# Patient Record
Sex: FEMALE | Race: WHITE | Employment: PART TIME | URBAN - METROPOLITAN AREA
[De-identification: names, ages, dates, MRNs, and addresses within clinical notes are randomized per-mention and may not be internally consistent; named-entity substitution may affect disease eponyms.]

---

## 2017-03-21 ENCOUNTER — OFFICE VISIT (OUTPATIENT)
Dept: FAMILY MEDICINE CLINIC | Facility: CLINIC | Age: 72
End: 2017-03-21

## 2017-03-21 VITALS
RESPIRATION RATE: 20 BRPM | SYSTOLIC BLOOD PRESSURE: 112 MMHG | HEIGHT: 64 IN | HEART RATE: 69 BPM | OXYGEN SATURATION: 97 % | TEMPERATURE: 98 F | DIASTOLIC BLOOD PRESSURE: 80 MMHG | WEIGHT: 140 LBS | BODY MASS INDEX: 23.9 KG/M2

## 2017-03-21 DIAGNOSIS — R81 GLUCOSURIA: ICD-10-CM

## 2017-03-21 DIAGNOSIS — R30.0 DYSURIA: ICD-10-CM

## 2017-03-21 DIAGNOSIS — R10.9 FLANK PAIN: ICD-10-CM

## 2017-03-21 LAB
APPEARANCE: CLEAR
BILIRUBIN: NEGATIVE
GLUCOSE (URINE DIPSTICK): 500 MG/DL
GLUCOSE BLOOD: 269
KETONES (URINE DIPSTICK): NEGATIVE MG/DL
MULTISTIX LOT#: NORMAL NUMERIC
NITRITE, URINE: NEGATIVE
OCCULT BLOOD: NEGATIVE
PH, URINE: 5.5 (ref 4.5–8)
PROTEIN (URINE DIPSTICK): NEGATIVE MG/DL
SPECIFIC GRAVITY: 1.01 (ref 1–1.03)
TEST STRIP LOT #: NORMAL NUMERIC
URINE-COLOR: YELLOW
UROBILINOGEN,SEMI-QN: 0.2 MG/DL (ref 0–1.9)

## 2017-03-21 PROCEDURE — 82962 GLUCOSE BLOOD TEST: CPT | Performed by: NURSE PRACTITIONER

## 2017-03-21 PROCEDURE — 87088 URINE BACTERIA CULTURE: CPT | Performed by: NURSE PRACTITIONER

## 2017-03-21 PROCEDURE — 87086 URINE CULTURE/COLONY COUNT: CPT | Performed by: NURSE PRACTITIONER

## 2017-03-21 PROCEDURE — 87186 SC STD MICRODIL/AGAR DIL: CPT | Performed by: NURSE PRACTITIONER

## 2017-03-21 PROCEDURE — 81003 URINALYSIS AUTO W/O SCOPE: CPT | Performed by: NURSE PRACTITIONER

## 2017-03-21 PROCEDURE — 99213 OFFICE O/P EST LOW 20 MIN: CPT | Performed by: NURSE PRACTITIONER

## 2017-03-21 RX ORDER — AMOXICILLIN AND CLAVULANATE POTASSIUM 875; 125 MG/1; MG/1
1 TABLET, FILM COATED ORAL 2 TIMES DAILY
Qty: 28 TABLET | Refills: 0 | OUTPATIENT
Start: 2017-03-21 | End: 2017-04-04

## 2017-03-21 RX ORDER — CEFIXIME 400 MG/1
400 CAPSULE ORAL DAILY
Qty: 14 CAPSULE | Refills: 0 | Status: SHIPPED | OUTPATIENT
Start: 2017-03-21 | End: 2017-03-21 | Stop reason: ALTCHOICE

## 2017-03-21 NOTE — PROGRESS NOTES
CHIEF COMPLAINT:   Patient presents with:  Back Pain: Near kidney for 3 weeks  Painful Urination: s/s since today  Burning On Urination      HPI:   Ardyth Course is a 80year old female who presents with symptoms of UTI.  Complaining of urinary and dy LUNGS: denies shortness of breath, cough, or wheezing  GI: See HPI. No N/V/C/D. : See HPI. NEURO: no headaches.     EXAM:   /80 mmHg  Pulse 69  Temp(Src) 97.9 °F (36.6 °C) (Oral)  Resp 20  Ht 64\"  Wt 140 lb  BMI 24.02 kg/m2  SpO2 97%  GENERAL: w - urine + for trace leukocytes; reporting flank pain that is improving. Given hx of pyelo, will treat with cefixime. Hx of quinolone tendinopathy; not choosing bactrim d/t taking ACE-I and no kidney function/K+ results in Epic.     - Cefixime 400 MG Oral · Pain or burning when urinating  · Having to urinate more often than usual  · Urgent need to urinate  · Only a small amount of urine comes out  · Blood in urine  · Abdominal discomfort. This is usually in the lower abdomen above the pubic bone.   · Cloudy · You can use acetaminophen or ibuprofen for pain, fever, or discomfort, unless another medicine was prescribed. If you have chronic liver or kidney disease, talk with your healthcare provider before using these medicines.  Also talk with your provider if y · Fainting or loss of consciousness  · Rapid heart rate  When to seek medical advice  Call your healthcare provider right away if any of these occur:  · Fever of 100.4ºF (38. 0ºC) or higher, or as directed by your healthcare provider  · Symptoms are not bet

## 2017-03-24 ENCOUNTER — TELEPHONE (OUTPATIENT)
Dept: FAMILY MEDICINE CLINIC | Facility: CLINIC | Age: 72
End: 2017-03-24

## 2017-03-24 RX ORDER — CEFDINIR 300 MG/1
300 CAPSULE ORAL 2 TIMES DAILY
Qty: 20 CAPSULE | Refills: 0 | Status: SHIPPED | OUTPATIENT
Start: 2017-03-24 | End: 2017-04-03

## 2017-03-24 NOTE — TELEPHONE ENCOUNTER
Spoke with patient regarding UA culture results. Bacteria resistant to penicillin, is currently taking Augmentin.   Medication switched to cefdinir 300, BID x10 days- $12 copay per pharmacist.  Discussed d/c'ing Augmentin and starting cefdinir as directed

## 2017-08-11 ENCOUNTER — OFFICE VISIT (OUTPATIENT)
Dept: FAMILY MEDICINE CLINIC | Facility: CLINIC | Age: 72
End: 2017-08-11

## 2017-08-11 VITALS
HEART RATE: 68 BPM | WEIGHT: 145 LBS | DIASTOLIC BLOOD PRESSURE: 80 MMHG | SYSTOLIC BLOOD PRESSURE: 138 MMHG | TEMPERATURE: 98 F | BODY MASS INDEX: 25 KG/M2 | RESPIRATION RATE: 16 BRPM | OXYGEN SATURATION: 97 %

## 2017-08-11 DIAGNOSIS — H61.23 BILATERAL IMPACTED CERUMEN: ICD-10-CM

## 2017-08-11 DIAGNOSIS — J01.00 ACUTE MAXILLARY SINUSITIS, RECURRENCE NOT SPECIFIED: Primary | ICD-10-CM

## 2017-08-11 PROCEDURE — 99213 OFFICE O/P EST LOW 20 MIN: CPT | Performed by: NURSE PRACTITIONER

## 2017-08-11 PROCEDURE — 69209 REMOVE IMPACTED EAR WAX UNI: CPT | Performed by: NURSE PRACTITIONER

## 2017-08-11 RX ORDER — CEFDINIR 300 MG/1
300 CAPSULE ORAL 2 TIMES DAILY
Qty: 20 CAPSULE | Refills: 0 | Status: SHIPPED | OUTPATIENT
Start: 2017-08-11 | End: 2017-08-21

## 2017-08-11 NOTE — PROGRESS NOTES
CHIEF COMPLAINT:   Patient presents with:  Ear Problem: right      HPI:   Gilmer Fatima is a 71year old female who presents for upper respiratory symptoms for  1 weeks.  Patient reports congestion, ear pain, sinus pain, OTC cold meds have not been help SKIN: no rashes, no suspicious lesions  HEAD: atraumatic, normocephalic.  mild tenderness on palpation of maxillary  sinuses.   EYES: conjunctiva clear, EOM intact  EARS: TM's clear, no bulging, no retraction,small clear fluid, bony landmarks visible  NOSE: Sinus headaches can cause a gnawing pain behind the nose and eyes. The pain most often gets worse in the afternoon and evening. You may also run a fever. Sinus headaches are caused by colds or allergies that make the nasal passages inflamed or infected.   Kermit Goldberg

## 2017-08-11 NOTE — PATIENT INSTRUCTIONS
Sinus Headaches     When using nasal spray, keep your chin down and angle the spray away from center. Sinus headaches can cause a gnawing pain behind the nose and eyes. The pain most often gets worse in the afternoon and evening.  You may also run a f

## 2017-11-03 ENCOUNTER — OFFICE VISIT (OUTPATIENT)
Dept: FAMILY MEDICINE CLINIC | Facility: CLINIC | Age: 72
End: 2017-11-03

## 2017-11-03 VITALS
DIASTOLIC BLOOD PRESSURE: 90 MMHG | SYSTOLIC BLOOD PRESSURE: 134 MMHG | BODY MASS INDEX: 24.16 KG/M2 | OXYGEN SATURATION: 95 % | TEMPERATURE: 98 F | WEIGHT: 145 LBS | RESPIRATION RATE: 20 BRPM | HEIGHT: 65 IN | HEART RATE: 90 BPM

## 2017-11-03 DIAGNOSIS — J40 BRONCHITIS: ICD-10-CM

## 2017-11-03 DIAGNOSIS — J01.00 ACUTE MAXILLARY SINUSITIS, RECURRENCE NOT SPECIFIED: Primary | ICD-10-CM

## 2017-11-03 PROCEDURE — 99213 OFFICE O/P EST LOW 20 MIN: CPT | Performed by: NURSE PRACTITIONER

## 2017-11-03 RX ORDER — BENZONATATE 200 MG/1
200 CAPSULE ORAL 3 TIMES DAILY PRN
Qty: 20 CAPSULE | Refills: 0 | Status: SHIPPED | OUTPATIENT
Start: 2017-11-03 | End: 2017-12-16

## 2017-11-03 RX ORDER — CEFDINIR 300 MG/1
300 CAPSULE ORAL 2 TIMES DAILY
Qty: 20 CAPSULE | Refills: 0 | Status: SHIPPED | OUTPATIENT
Start: 2017-11-03 | End: 2017-11-13

## 2017-11-03 NOTE — PATIENT INSTRUCTIONS
Bronchitis, Antibiotic Treatment (Adult)    Bronchitis is an infection of the air passages (bronchial tubes) in your lungs. It often occurs when you have a cold.  This illness is contagious during the first few days and is spread through the air by coughi Follow-up care  Follow up with your healthcare provider, or as advised. If you had an X-ray or ECG (electrocardiogram), a specialist will review it. You will be notified of any new findings that may affect your care.   If you are age 72 or older, or if you Sinuses are air-filled spaces in the skull behind the face. They are kept moist and clean by a lining of mucosa. Things such as pollen, smoke, and chemical fumes can irritate the mucosa. It can then swell up.  As a response to irritation, the mucosa makes m © 0431-4265 [employer] [address]. [address] [facility] [place], [facility] [place]. All rights reserved. This information is not intended as a substitute for professional medical care. Always follow your healthcare professional's instructions.

## 2017-11-03 NOTE — PROGRESS NOTES
CHIEF COMPLAINT:   Patient presents with:  Stuffy Nose: post nasal drip s/s for 3 days  Headache  Cough: wet      HPI:   Miranda Jefferson is a 71year old female who presents for sinus congestion, cough, headache, sore throat, chills for  4  days.  Symptom REVIEW OF SYSTEMS:   GENERAL: feels well otherwise, no unplanned weight change,  Decreased appetite  SKIN: no rashes or abnormal skin lesions  HEENT: See HPI.     LUNGS: denies shortness of breath or wheezing, See HPI  CARDIOVASCULAR: denies chest pain or p Signed Prescriptions Disp Refills    cefdinir 300 MG Oral Cap 20 capsule 0      Sig: Take 1 capsule (300 mg total) by mouth 2 (two) times daily.       benzonatate 200 MG Oral Cap 20 capsule 0      Sig: Take 1 capsule (200 mg total) by mouth 3 (three) times · Your appetite may be poor, so a light diet is fine. Avoid dehydration by drinking 6 to 8 glasses of fluids per day (such as water, soft drinks, sports drinks, juices, tea, or soup). Extra fluids will help loosen secretions in the nose and lungs.   · Over- Acute sinusitis is irritation and swelling of the sinuses. It is usually caused by a viral infection after a common cold. Your doctor can help you find relief. What is acute sinusitis? Sinuses are air-filled spaces in the skull behind the face.  They are © 9555-0636 [employer] [address]. [address] [facility] [place], [facility] [place]. All rights reserved. This information is not intended as a substitute for professional medical care. Always follow your healthcare professional's instructions.             The

## 2017-12-16 ENCOUNTER — HOSPITAL ENCOUNTER (INPATIENT)
Facility: HOSPITAL | Age: 72
LOS: 1 days | Discharge: HOME | DRG: 193 | End: 2017-12-17
Attending: EMERGENCY MEDICINE | Admitting: STUDENT IN AN ORGANIZED HEALTH CARE EDUCATION/TRAINING PROGRAM
Payer: MEDICARE

## 2017-12-16 ENCOUNTER — APPOINTMENT (OUTPATIENT)
Dept: GENERAL RADIOLOGY | Age: 72
DRG: 193 | End: 2017-12-16
Payer: MEDICARE

## 2017-12-16 DIAGNOSIS — J98.01 BRONCHOSPASM: ICD-10-CM

## 2017-12-16 DIAGNOSIS — J18.9 COMMUNITY ACQUIRED PNEUMONIA, UNSPECIFIED LATERALITY: Primary | ICD-10-CM

## 2017-12-16 DIAGNOSIS — R73.9 HYPERGLYCEMIA: ICD-10-CM

## 2017-12-16 DIAGNOSIS — R09.02 HYPOXIA: ICD-10-CM

## 2017-12-16 PROCEDURE — 71020 XR CHEST PA + LAT CHEST (CPT=71020): CPT | Performed by: EMERGENCY MEDICINE

## 2017-12-16 PROCEDURE — 99223 1ST HOSP IP/OBS HIGH 75: CPT | Performed by: HOSPITALIST

## 2017-12-16 RX ORDER — SODIUM CHLORIDE 9 MG/ML
125 INJECTION, SOLUTION INTRAVENOUS CONTINUOUS
Status: DISCONTINUED | OUTPATIENT
Start: 2017-12-16 | End: 2017-12-16

## 2017-12-16 RX ORDER — ENOXAPARIN SODIUM 100 MG/ML
40 INJECTION SUBCUTANEOUS DAILY
Status: DISCONTINUED | OUTPATIENT
Start: 2017-12-16 | End: 2017-12-17

## 2017-12-16 RX ORDER — ONDANSETRON 2 MG/ML
8 INJECTION INTRAMUSCULAR; INTRAVENOUS EVERY 6 HOURS PRN
Status: DISCONTINUED | OUTPATIENT
Start: 2017-12-16 | End: 2017-12-17

## 2017-12-16 RX ORDER — ALBUTEROL SULFATE 2.5 MG/3ML
2.5 SOLUTION RESPIRATORY (INHALATION) EVERY 2 HOUR PRN
Status: DISCONTINUED | OUTPATIENT
Start: 2017-12-16 | End: 2017-12-17

## 2017-12-16 RX ORDER — DEXTROSE MONOHYDRATE 25 G/50ML
50 INJECTION, SOLUTION INTRAVENOUS
Status: DISCONTINUED | OUTPATIENT
Start: 2017-12-16 | End: 2017-12-17

## 2017-12-16 RX ORDER — ACETAMINOPHEN 500 MG
1000 TABLET ORAL ONCE
Status: COMPLETED | OUTPATIENT
Start: 2017-12-16 | End: 2017-12-16

## 2017-12-16 RX ORDER — INSULIN ASPART 100 [IU]/ML
0.1 INJECTION, SOLUTION INTRAVENOUS; SUBCUTANEOUS ONCE
Status: COMPLETED | OUTPATIENT
Start: 2017-12-16 | End: 2017-12-16

## 2017-12-16 RX ORDER — AMLODIPINE BESYLATE 5 MG/1
5 TABLET ORAL DAILY
Status: DISCONTINUED | OUTPATIENT
Start: 2017-12-17 | End: 2017-12-17

## 2017-12-16 RX ORDER — ACETAMINOPHEN 325 MG/1
650 TABLET ORAL EVERY 6 HOURS PRN
Status: DISCONTINUED | OUTPATIENT
Start: 2017-12-16 | End: 2017-12-17

## 2017-12-16 RX ORDER — TRAZODONE HYDROCHLORIDE 50 MG/1
50 TABLET ORAL NIGHTLY PRN
Status: DISCONTINUED | OUTPATIENT
Start: 2017-12-16 | End: 2017-12-17

## 2017-12-16 RX ORDER — LEVOTHYROXINE SODIUM 0.05 MG/1
50 TABLET ORAL
Status: DISCONTINUED | OUTPATIENT
Start: 2017-12-17 | End: 2017-12-17

## 2017-12-16 RX ORDER — LISINOPRIL 40 MG/1
40 TABLET ORAL DAILY
Status: DISCONTINUED | OUTPATIENT
Start: 2017-12-17 | End: 2017-12-17

## 2017-12-16 NOTE — H&P
[facility] HOSPITALIST  History and Physical     Estefanía Upton Patient Status:  Inpatient    1945 MRN [id number]   [facility] 5NW-A Attending Hemant Richardson MD   Hosp Day # 0 PCP Vasile Shoemaker     Chief Complaint: cough    History of (64.9 kg)   SpO2 96%   BMI 23.80 kg/m²   General: No acute distress. Alert and oriented x 3. HEENT: Normocephalic atraumatic. Moist mucous membranes. EOM-I. PERRLA. Anicteric. Neck: No lymphadenopathy. No JVD. No carotid bruits.   Respiratory: Course in b

## 2017-12-16 NOTE — PROGRESS NOTES
NURSING ADMISSION NOTE      Patient admitted via Ambulance  Oriented to room. Safety precautions initiated. Bed in low position. Call light in reach. Pt here from [place] [facility]. A+Ox4. VSS on 3L of O2. Wears no O2 at baseline. Up adlib.  Resting i

## 2017-12-16 NOTE — ED NOTES
Nasal swab done for influenza/respiratory panel testing. Universal precautions used while obtaining sample. Sample taken to lab. Yuliya well by pt.

## 2017-12-16 NOTE — ED PROVIDER NOTES
Patient Seen in: [facility] [place]    History   Patient presents with:  Cough/URI    Stated Complaint: cough    HPI    Patient complains of cough that started on Monday.   Patient has history of diabetes, high blood pressure, and h Supple  Lungs: Rhonchi bilaterally some cleared with cough. There is prolonged expiration and wheeze especially with cough  Heart: Normal S1 and S2, without murmur or rub. Distal pulses are strong and symmetric. Abdomen: Soft, nondistended.   Completely hypoxia suggest pneumonia. She was treated with supplemental oxygen with improvement of her sats. Patient treated with IV fluid and continuous albuterol and Atrovent nebulizer.   She was started on Rocephin and Zithromax antibiotic    Chest x-ray shows bi

## 2017-12-17 VITALS
WEIGHT: 145 LBS | RESPIRATION RATE: 16 BRPM | SYSTOLIC BLOOD PRESSURE: 131 MMHG | TEMPERATURE: 98 F | HEART RATE: 74 BPM | HEIGHT: 65 IN | BODY MASS INDEX: 24.16 KG/M2 | DIASTOLIC BLOOD PRESSURE: 59 MMHG | OXYGEN SATURATION: 97 %

## 2017-12-17 PROCEDURE — 99239 HOSP IP/OBS DSCHRG MGMT >30: CPT | Performed by: HOSPITALIST

## 2017-12-17 RX ORDER — DOXYCYCLINE HYCLATE 100 MG/1
100 CAPSULE ORAL 2 TIMES DAILY
Qty: 8 CAPSULE | Refills: 0 | Status: SHIPPED | OUTPATIENT
Start: 2017-12-17 | End: 2017-12-21

## 2017-12-17 RX ORDER — ALBUTEROL SULFATE 90 UG/1
1 AEROSOL, METERED RESPIRATORY (INHALATION) EVERY 6 HOURS PRN
Qty: 1 INHALER | Refills: 1 | Status: SHIPPED | OUTPATIENT
Start: 2017-12-17 | End: 2019-02-07 | Stop reason: ALTCHOICE

## 2017-12-17 RX ORDER — OSELTAMIVIR PHOSPHATE 75 MG/1
75 CAPSULE ORAL 2 TIMES DAILY
Status: DISCONTINUED | OUTPATIENT
Start: 2017-12-17 | End: 2017-12-17

## 2017-12-17 NOTE — PROGRESS NOTES
SPO2% ON ROOM AIR AT REST:94%    SPO2% AMBULATION ON ROOM AIR:91%    SPO2% AMBULATION ON O2:n/a ON n/a LITERS PER MINUTE

## 2017-12-17 NOTE — PLAN OF CARE
Diabetes/Glucose Control    • Glucose maintained within prescribed range Progressing        Patient/Family Goals    • Patient/Family Long Term Goal Progressing    • Patient/Family Short Term Goal Progressing            PROBLEM: PNA    ASSESS: Pt. Very plea

## 2017-12-17 NOTE — PROGRESS NOTES
NURSING DISCHARGE NOTE    Discharged Home via Wheelchair. Accompanied by Support staff  Belongings Taken by patient/family. DC HOME WITH FAMILY, IV REMOVED. OK FOR DC PER HOSPITALIST. DC INSTRUCTIONS/MED LIST/SCRIPTS GIVEN TO PATIENT AND EXPLAINED.  Q

## 2017-12-17 NOTE — PLAN OF CARE
Diabetes/Glucose Control    • Glucose maintained within prescribed range Progressing        Patient/Family Goals    • Patient/Family Long Term Goal Progressing    • Patient/Family Short Term Goal Progressing        DX: FLU/PNA  PLAN OF CARE: Shivani DUBOIS

## 2017-12-17 NOTE — PROGRESS NOTES
SP02% on room air at rest 93%     SP02% ambulation on room air 91%    Ambulated around unit (375 feet) on RA, maintains O2 saturation at 91% on RA.

## 2017-12-17 NOTE — PROGRESS NOTES
Not needing oxygen; breathing better with less cough and dyspnea. Vitals stable. Exam shows mostly clear lungs. Ok to d/c today. Outside window for tamiflu trx.     Mercy Tobias MD  [facility]  Internal Medicine Hospitalist  Pager [phone]

## 2017-12-18 NOTE — DISCHARGE SUMMARY
[facility] HOSPITALIST  DISCHARGE SUMMARY     Andrew Upton Patient Status:  Inpatient    1945 MRN [id number]   [facility] 5NW-A Attending No att. providers found   Hosp Day # 1 PAOLO Smith     Date of Admission: 2017  Date Prescription details   Albuterol Sulfate  (90 Base) MCG/ACT Aers  Commonly known as:  PROAIR HFA      Inhale 1 puff into the lungs every 6 (six) hours as needed for Wheezing.    Quantity:  1 Inhaler  Refills:  1     Doxycycline Hyclate 100 MG Caps  C extremities. Extremities: No edema.   -----------------------------------------------------------------------------------------------  PATIENT DISCHARGE INSTRUCTIONS: See electronic chart    Alexis Dempsey MD 12/18/2017    Time spent:  > 30 minutes

## 2018-03-04 ENCOUNTER — OFFICE VISIT (OUTPATIENT)
Dept: FAMILY MEDICINE CLINIC | Facility: CLINIC | Age: 73
End: 2018-03-04

## 2018-03-04 VITALS
TEMPERATURE: 98 F | OXYGEN SATURATION: 97 % | RESPIRATION RATE: 20 BRPM | BODY MASS INDEX: 24.16 KG/M2 | HEART RATE: 62 BPM | HEIGHT: 65 IN | WEIGHT: 145 LBS | SYSTOLIC BLOOD PRESSURE: 122 MMHG | DIASTOLIC BLOOD PRESSURE: 70 MMHG

## 2018-03-04 DIAGNOSIS — R30.0 DYSURIA: Primary | ICD-10-CM

## 2018-03-04 LAB
APPEARANCE: CLEAR
GLUCOSE (URINE DIPSTICK): 500 MG/DL
MULTISTIX LOT#: ABNORMAL NUMERIC
PH, URINE: 5.5 (ref 4.5–8)
SPECIFIC GRAVITY: 1.01 (ref 1–1.03)
URINE-COLOR: YELLOW
UROBILINOGEN,SEMI-QN: 0.2 MG/DL (ref 0–1.9)

## 2018-03-04 PROCEDURE — 99213 OFFICE O/P EST LOW 20 MIN: CPT | Performed by: NURSE PRACTITIONER

## 2018-03-04 PROCEDURE — 87086 URINE CULTURE/COLONY COUNT: CPT | Performed by: NURSE PRACTITIONER

## 2018-03-04 PROCEDURE — 81003 URINALYSIS AUTO W/O SCOPE: CPT | Performed by: NURSE PRACTITIONER

## 2018-03-04 RX ORDER — NITROFURANTOIN 25; 75 MG/1; MG/1
100 CAPSULE ORAL 2 TIMES DAILY
Qty: 14 CAPSULE | Refills: 0 | Status: SHIPPED | OUTPATIENT
Start: 2018-03-04 | End: 2018-03-11

## 2018-03-04 NOTE — PATIENT INSTRUCTIONS
· Wipe from front to back after using the bathroom every time. Consider wet wipes for cleaning.   · If sexually active urinate before and after sexual intercourse and wipe front to back  · Stay well hydrated, wear cotton underwear and avoid thongs to decrea

## 2018-03-04 NOTE — PROGRESS NOTES
John Serrato is a 71year old female. HPI:   Patient presents with urinary symptoms. Complaining of urinary frequency, urgency, dysuria, suprapubic pain, hematuria. Denies back pain, fever.   Duration: 2 days  Sexual activity: yes with partner  Vagina Normal rate, normal S1, S2  SKIN: no rashes,no suspicious lesions  MUSCULOSKELETAL:  No CVA tenderness  GI/:  abd soft, bladder soft and nondistended. no suprapubic tenderness  PSYCHIATRIC:  Alert and oriented x 3.     Results for orders placed or perform

## 2019-02-07 ENCOUNTER — OFFICE VISIT (OUTPATIENT)
Dept: INTERNAL MEDICINE CLINIC | Facility: CLINIC | Age: 74
End: 2019-02-07
Payer: MEDICARE

## 2019-02-07 ENCOUNTER — TELEPHONE (OUTPATIENT)
Dept: INTERNAL MEDICINE CLINIC | Facility: CLINIC | Age: 74
End: 2019-02-07

## 2019-02-07 VITALS
OXYGEN SATURATION: 98 % | RESPIRATION RATE: 18 BRPM | HEART RATE: 65 BPM | TEMPERATURE: 99 F | SYSTOLIC BLOOD PRESSURE: 134 MMHG | DIASTOLIC BLOOD PRESSURE: 70 MMHG | HEIGHT: 65 IN | WEIGHT: 147 LBS | BODY MASS INDEX: 24.49 KG/M2

## 2019-02-07 DIAGNOSIS — E11.65 UNCONTROLLED TYPE 2 DIABETES MELLITUS WITH HYPERGLYCEMIA (HCC): Primary | ICD-10-CM

## 2019-02-07 DIAGNOSIS — Z51.81 ENCOUNTER FOR MEDICATION MONITORING: ICD-10-CM

## 2019-02-07 DIAGNOSIS — Z12.39 SCREENING FOR BREAST CANCER: ICD-10-CM

## 2019-02-07 DIAGNOSIS — E03.9 HYPOTHYROIDISM, UNSPECIFIED TYPE: ICD-10-CM

## 2019-02-07 DIAGNOSIS — Z78.0 POSTMENOPAUSAL ESTROGEN DEFICIENCY: ICD-10-CM

## 2019-02-07 LAB
CARTRIDGE LOT#: 931 NUMERIC
HEMOGLOBIN A1C: 13.5 % (ref 4.3–5.6)

## 2019-02-07 PROCEDURE — 83036 HEMOGLOBIN GLYCOSYLATED A1C: CPT | Performed by: INTERNAL MEDICINE

## 2019-02-07 PROCEDURE — 99214 OFFICE O/P EST MOD 30 MIN: CPT | Performed by: INTERNAL MEDICINE

## 2019-02-07 RX ORDER — FLUCONAZOLE 150 MG/1
150 TABLET ORAL ONCE
Qty: 1 TABLET | Refills: 0 | Status: SHIPPED | OUTPATIENT
Start: 2019-02-07 | End: 2019-02-07

## 2019-02-07 RX ORDER — NITROFURANTOIN 25; 75 MG/1; MG/1
100 CAPSULE ORAL 2 TIMES DAILY
Qty: 14 CAPSULE | Refills: 0 | Status: SHIPPED | OUTPATIENT
Start: 2019-02-07 | End: 2019-03-04

## 2019-02-07 NOTE — TELEPHONE ENCOUNTER
Records Requested from:    Dr. Ela Ny (Previous PCP)  Phone: [phone]  Fax: [phone]      Confirmation was received. Copy of form made and placed in teal accordion file. Original form sent to scanning.

## 2019-02-07 NOTE — PROGRESS NOTES
[facility] Internal Medicine Office Note  Chief Complaint:   Patient presents with:  Establish Care  Diabetes      HPI:   This is a 68year old female coming in for establishing care  HPI    HTN - blood pressure has been doing well     Hypothyroi Rfl:          Depression Screening (PHQ-2/PHQ-9): Over the LAST 2 WEEKS   Little interest or pleasure in doing things (over the last two weeks)?: Not at all    Feeling down, depressed, or hopeless (over the last two weeks)?: Not at all    PHQ-2 SCORE: 0 visit:    Uncontrolled type 2 diabetes mellitus with hyperglycemia ([facility] [address] - a1c very uncontrolled at 13.5. She needs follow up with diabetic educator and endocrinology. Continue metformin and continue glimepiride for now.   Initiating jardiance but cost may Screen due on 12/16/2018  Fall Risk Screening due on 12/17/2018  Patient/Caregiver Education: Patient/Caregiver Education: There are no barriers to learning. Medical education done. Outcome: Patient verbalizes understanding.  Patient is notified to call wit

## 2019-02-07 NOTE — PATIENT INSTRUCTIONS
Blood work prior to next appointment    Make appointment for diabetic eye exam     Make appointment for endocrinology - call today     Make appointment for diabetes educator     Start jardiance once a day

## 2019-03-01 ENCOUNTER — APPOINTMENT (OUTPATIENT)
Dept: LAB | Age: 74
End: 2019-03-01
Attending: INTERNAL MEDICINE
Payer: MEDICARE

## 2019-03-01 DIAGNOSIS — E03.9 HYPOTHYROIDISM, UNSPECIFIED TYPE: ICD-10-CM

## 2019-03-01 DIAGNOSIS — E11.65 UNCONTROLLED TYPE 2 DIABETES MELLITUS WITH HYPERGLYCEMIA (HCC): ICD-10-CM

## 2019-03-01 DIAGNOSIS — Z51.81 ENCOUNTER FOR MEDICATION MONITORING: ICD-10-CM

## 2019-03-01 LAB
ALBUMIN SERPL-MCNC: 3.8 G/DL (ref 3.4–5)
ALBUMIN/GLOB SERPL: 1.2 {RATIO} (ref 1–2)
ALP LIVER SERPL-CCNC: 49 U/L (ref 55–142)
ALT SERPL-CCNC: 49 U/L (ref 13–56)
ANION GAP SERPL CALC-SCNC: 6 MMOL/L (ref 0–18)
AST SERPL-CCNC: 33 U/L (ref 15–37)
BILIRUB SERPL-MCNC: 0.6 MG/DL (ref 0.1–2)
BUN BLD-MCNC: 19 MG/DL (ref 7–18)
BUN/CREAT SERPL: 21.1 (ref 10–20)
CALCIUM BLD-MCNC: 8.7 MG/DL (ref 8.5–10.1)
CHLORIDE SERPL-SCNC: 108 MMOL/L (ref 98–107)
CHOLEST SMN-MCNC: 105 MG/DL (ref ?–200)
CO2 SERPL-SCNC: 27 MMOL/L (ref 21–32)
CREAT BLD-MCNC: 0.9 MG/DL (ref 0.55–1.02)
CREAT UR-SCNC: 70.3 MG/DL
GLOBULIN PLAS-MCNC: 3.3 G/DL (ref 2.8–4.4)
GLUCOSE BLD-MCNC: 200 MG/DL (ref 70–99)
HDLC SERPL-MCNC: 60 MG/DL (ref 40–59)
LDLC SERPL CALC-MCNC: 33 MG/DL (ref ?–100)
M PROTEIN MFR SERPL ELPH: 7.1 G/DL (ref 6.4–8.2)
MICROALBUMIN UR-MCNC: 0.68 MG/DL
MICROALBUMIN/CREAT 24H UR-RTO: 9.7 UG/MG (ref ?–30)
NONHDLC SERPL-MCNC: 45 MG/DL (ref ?–130)
OSMOLALITY SERPL CALC.SUM OF ELEC: 300 MOSM/KG (ref 275–295)
POTASSIUM SERPL-SCNC: 4.8 MMOL/L (ref 3.5–5.1)
SODIUM SERPL-SCNC: 141 MMOL/L (ref 136–145)
TRIGL SERPL-MCNC: 60 MG/DL (ref 30–149)
TSI SER-ACNC: 3.31 MIU/ML (ref 0.36–3.74)
VLDLC SERPL CALC-MCNC: 12 MG/DL (ref 0–30)

## 2019-03-01 PROCEDURE — 36415 COLL VENOUS BLD VENIPUNCTURE: CPT

## 2019-03-01 PROCEDURE — 80053 COMPREHEN METABOLIC PANEL: CPT

## 2019-03-01 PROCEDURE — 84443 ASSAY THYROID STIM HORMONE: CPT

## 2019-03-01 PROCEDURE — 82570 ASSAY OF URINE CREATININE: CPT

## 2019-03-01 PROCEDURE — 82043 UR ALBUMIN QUANTITATIVE: CPT

## 2019-03-01 PROCEDURE — 80061 LIPID PANEL: CPT

## 2019-03-04 PROBLEM — E78.5 DYSLIPIDEMIA: Status: ACTIVE | Noted: 2019-03-04

## 2019-03-04 PROBLEM — I10 ESSENTIAL HYPERTENSION: Status: ACTIVE | Noted: 2019-03-04

## 2019-03-07 ENCOUNTER — OFFICE VISIT (OUTPATIENT)
Dept: INTERNAL MEDICINE CLINIC | Facility: CLINIC | Age: 74
End: 2019-03-07
Payer: MEDICARE

## 2019-03-07 VITALS
WEIGHT: 139.5 LBS | HEIGHT: 65 IN | HEART RATE: 84 BPM | SYSTOLIC BLOOD PRESSURE: 122 MMHG | RESPIRATION RATE: 16 BRPM | BODY MASS INDEX: 23.24 KG/M2 | TEMPERATURE: 98 F | OXYGEN SATURATION: 97 % | DIASTOLIC BLOOD PRESSURE: 64 MMHG

## 2019-03-07 DIAGNOSIS — IMO0002 TOE PROBLEM: ICD-10-CM

## 2019-03-07 DIAGNOSIS — Z00.00 WELLNESS EXAMINATION: Primary | ICD-10-CM

## 2019-03-07 DIAGNOSIS — L85.3 DRY SKIN: ICD-10-CM

## 2019-03-07 DIAGNOSIS — E11.65 UNCONTROLLED TYPE 2 DIABETES MELLITUS WITH HYPERGLYCEMIA (HCC): ICD-10-CM

## 2019-03-07 DIAGNOSIS — E03.9 HYPOTHYROIDISM, UNSPECIFIED TYPE: ICD-10-CM

## 2019-03-07 DIAGNOSIS — I10 ESSENTIAL HYPERTENSION: ICD-10-CM

## 2019-03-07 PROBLEM — J18.9 COMMUNITY ACQUIRED PNEUMONIA: Status: RESOLVED | Noted: 2017-12-16 | Resolved: 2019-03-07

## 2019-03-07 PROBLEM — J98.01 BRONCHOSPASM: Status: RESOLVED | Noted: 2017-12-16 | Resolved: 2019-03-07

## 2019-03-07 PROBLEM — R09.02 HYPOXIA: Status: RESOLVED | Noted: 2017-12-16 | Resolved: 2019-03-07

## 2019-03-07 PROCEDURE — G0439 PPPS, SUBSEQ VISIT: HCPCS | Performed by: INTERNAL MEDICINE

## 2019-03-07 PROCEDURE — 96160 PT-FOCUSED HLTH RISK ASSMT: CPT | Performed by: INTERNAL MEDICINE

## 2019-03-07 PROCEDURE — 99397 PER PM REEVAL EST PAT 65+ YR: CPT | Performed by: INTERNAL MEDICINE

## 2019-03-07 NOTE — PROGRESS NOTES
REASON FOR VISIT:    Jaren Munoz is a 68year old female who presents for a MA Supervisit.     DM - she saw Dr. Pranav Srivastava  She is having issues with ozempic being covered so she has not received medication yet   She is on tresiba insulin 2 units and inc MG Oral Tab 2 (two) times daily. Disp:  Rfl:    AmLODIPine Besylate (NORVASC) 5 MG Oral Tab Take 1 tablet by mouth daily.  Disp:  Rfl:    Semaglutide (OZEMPIC) 0.25 or 0.5 MG/DOSE Subcutaneous Solution Pen-injector Inject 0.25 mg into the skin once a week Status     Hearing Problems?: No  Vision Problems? : No  Difficulty walking?: No  Difficulty dressing or bathing?: No  Problems with daily activities? : No  Memory Problems?: No      Fall/Risk Assessment     Have you fallen in the last 12 months?: 0-No  Fa Annually Creatinine (mg/dL)   Date Value   03/01/2019 0.90       Digoxin Serum Conc  Annually No results found for: DIGOXIN     Diabetes     HgbA1C  Annually HEMOGLOBIN A1C (%)   Date Value   02/07/2019 13.5       Creat/alb ratio  Annually Creatinine (mg/d kg/m²    > BP Readings from Last 3 Encounters:  03/07/19 : 122/64  03/04/19 : 130/78  02/07/19 : 134/70    GENERAL: well developed, well nourished, in no apparent distress  SKIN: no rashes, no suspicious lesions  HEENT: atraumatic, normocephalic, ears and Hypothyroidism -chronic. titrating dose as above. Hyperglycemia -uncontrolled DM. Insulin initiated and she is going up on dose every 3 days     Uncontrolled type 2 diabetes mellitus with hyperglycemia ([address] -new problem. Continue medication titration.

## 2019-03-07 NOTE — PATIENT INSTRUCTIONS
-Discuss with Dr. Jayce Roche goal TSH of 2.5 or below for thyroid     Vaseline to both feet nightly or Vanicream lotion

## 2019-03-28 ENCOUNTER — HOSPITAL ENCOUNTER (OUTPATIENT)
Dept: MAMMOGRAPHY | Age: 74
Discharge: HOME | End: 2019-03-28
Attending: INTERNAL MEDICINE
Payer: MEDICARE

## 2019-03-28 ENCOUNTER — HOSPITAL ENCOUNTER (OUTPATIENT)
Dept: BONE DENSITY | Age: 74
Discharge: HOME | End: 2019-03-28
Attending: INTERNAL MEDICINE
Payer: MEDICARE

## 2019-03-28 DIAGNOSIS — Z78.0 POSTMENOPAUSAL ESTROGEN DEFICIENCY: ICD-10-CM

## 2019-03-28 DIAGNOSIS — Z12.39 SCREENING FOR BREAST CANCER: ICD-10-CM

## 2019-03-28 PROCEDURE — 77063 BREAST TOMOSYNTHESIS BI: CPT | Performed by: INTERNAL MEDICINE

## 2019-03-28 PROCEDURE — 77067 SCR MAMMO BI INCL CAD: CPT | Performed by: INTERNAL MEDICINE

## 2019-03-28 PROCEDURE — 77080 DXA BONE DENSITY AXIAL: CPT | Performed by: INTERNAL MEDICINE

## 2019-04-04 RX ORDER — CALCIUM CITRATE/VITAMIN D3 200MG-6.25
TABLET ORAL
Qty: 200 STRIP | Refills: 0 | Status: SHIPPED | OUTPATIENT
Start: 2019-04-04 | End: 2019-06-03

## 2019-04-04 RX ORDER — BLOOD-GLUCOSE METER
1 EACH MISCELLANEOUS 2 TIMES DAILY
Qty: 1 DEVICE | Refills: 0 | Status: SHIPPED | OUTPATIENT
Start: 2019-04-04 | End: 2020-04-03

## 2019-04-04 RX ORDER — BLOOD-GLUCOSE CONTROL, LOW
1 EACH MISCELLANEOUS DAILY
Qty: 1 EACH | Refills: 0 | Status: SHIPPED | OUTPATIENT
Start: 2019-04-04

## 2019-04-04 RX ORDER — GLUCOSAM/CHON-MSM1/C/MANG/BOSW 500-416.6
1 TABLET ORAL 2 TIMES DAILY
Qty: 2 BOX | Refills: 0 | Status: SHIPPED | OUTPATIENT
Start: 2019-04-04 | End: 2020-03-16

## 2019-04-04 RX ORDER — BLOOD-GLUCOSE CONTROL, LOW
1 EACH MISCELLANEOUS DAILY
Qty: 1 EACH | Refills: 0 | Status: SHIPPED | OUTPATIENT
Start: 2019-04-04 | End: 2020-04-27

## 2019-04-04 RX ORDER — ISOPROPYL ALCOHOL 0.75 G/1
SWAB TOPICAL
Qty: 200 EACH | Refills: 0 | Status: SHIPPED | OUTPATIENT
Start: 2019-04-04 | End: 2019-07-09

## 2019-04-17 DIAGNOSIS — R73.9 HYPERGLYCEMIA: ICD-10-CM

## 2019-04-17 DIAGNOSIS — I10 ESSENTIAL HYPERTENSION: ICD-10-CM

## 2019-04-17 DIAGNOSIS — E06.3 HYPOTHYROIDISM DUE TO HASHIMOTO'S THYROIDITIS: ICD-10-CM

## 2019-04-17 DIAGNOSIS — E11.65 UNCONTROLLED TYPE 2 DIABETES MELLITUS WITH HYPERGLYCEMIA (HCC): ICD-10-CM

## 2019-04-17 DIAGNOSIS — E03.8 HYPOTHYROIDISM DUE TO HASHIMOTO'S THYROIDITIS: ICD-10-CM

## 2019-04-17 DIAGNOSIS — E78.5 DYSLIPIDEMIA: ICD-10-CM

## 2019-04-17 RX ORDER — GLIMEPIRIDE 4 MG/1
4 TABLET ORAL 2 TIMES DAILY
Qty: 180 TABLET | Refills: 3 | Status: CANCELLED | OUTPATIENT
Start: 2019-04-17

## 2019-04-17 RX ORDER — LEVOTHYROXINE SODIUM 0.05 MG/1
50 TABLET ORAL DAILY
Qty: 90 TABLET | Refills: 3 | Status: CANCELLED | OUTPATIENT
Start: 2019-04-17

## 2019-04-17 NOTE — TELEPHONE ENCOUNTER
Refill requested:   Requested Prescriptions     Pending Prescriptions Disp Refills   • metFORMIN HCl 1000 MG Oral Tab 180 tablet 3     Sig: Take 1 tablet (1,000 mg total) by mouth 2 (two) times daily with meals.    • Metoprolol Tartrate 50 MG Oral Tab 180 t Patient with [facility], [place] [facility]            [address] ED - [facility] AT [facility]  [address] [facility] [address] [facility] [facility]  6

## 2019-04-19 RX ORDER — METOPROLOL TARTRATE 50 MG/1
50 TABLET, FILM COATED ORAL 2 TIMES DAILY
Qty: 180 TABLET | Refills: 3 | Status: SHIPPED | OUTPATIENT
Start: 2019-04-19 | End: 2019-05-17

## 2019-04-19 RX ORDER — AMLODIPINE BESYLATE 5 MG/1
5 TABLET ORAL DAILY
Qty: 90 TABLET | Refills: 3 | Status: SHIPPED | OUTPATIENT
Start: 2019-04-19 | End: 2019-05-17

## 2019-04-19 RX ORDER — QUINAPRIL 40 MG/1
40 TABLET ORAL DAILY
Qty: 90 TABLET | Refills: 3 | Status: SHIPPED | OUTPATIENT
Start: 2019-04-19 | End: 2020-07-23

## 2019-04-25 ENCOUNTER — TELEPHONE (OUTPATIENT)
Dept: INTERNAL MEDICINE CLINIC | Facility: CLINIC | Age: 74
End: 2019-04-25

## 2019-04-25 NOTE — TELEPHONE ENCOUNTER
Received rx request from [facility] for her amlodipine and metformin pt still has refills at [facility] does she want us to switch to [facility].      LVM to call and clarify

## 2019-04-26 NOTE — TELEPHONE ENCOUNTER
Patient called back and stated that she picked up her prescriptions for  Amlodipine and metformin. She stated that she will talk with Dr. Gina George when she comes in July for her appointment to have her prescriptions sent to [facility] order pharmacy.  Please

## 2019-05-15 DIAGNOSIS — E03.8 HYPOTHYROIDISM DUE TO HASHIMOTO'S THYROIDITIS: ICD-10-CM

## 2019-05-15 DIAGNOSIS — E11.65 UNCONTROLLED TYPE 2 DIABETES MELLITUS WITH HYPERGLYCEMIA (HCC): ICD-10-CM

## 2019-05-15 DIAGNOSIS — E06.3 HYPOTHYROIDISM DUE TO HASHIMOTO'S THYROIDITIS: ICD-10-CM

## 2019-05-16 NOTE — TELEPHONE ENCOUNTER
Refill requested:   Requested Prescriptions     Pending Prescriptions Disp Refills   • amLODIPine Besylate 5 MG Oral Tab 90 tablet 3     Sig: Take 1 tablet (5 mg total) by mouth daily.    • glimepiride 4 MG Oral Tab 180 tablet 3     Sig: Take 1 tablet (4 mg

## 2019-05-17 RX ORDER — GLIMEPIRIDE 4 MG/1
4 TABLET ORAL 2 TIMES DAILY
Qty: 180 TABLET | Refills: 3 | OUTPATIENT
Start: 2019-05-17

## 2019-05-17 RX ORDER — LEVOTHYROXINE SODIUM 0.05 MG/1
50 TABLET ORAL DAILY
Qty: 90 TABLET | Refills: 1 | OUTPATIENT
Start: 2019-05-17

## 2019-05-17 RX ORDER — AMLODIPINE BESYLATE 5 MG/1
5 TABLET ORAL DAILY
Qty: 90 TABLET | Refills: 3 | Status: SHIPPED | OUTPATIENT
Start: 2019-05-17 | End: 2020-07-11

## 2019-05-17 RX ORDER — METOPROLOL TARTRATE 50 MG/1
50 TABLET, FILM COATED ORAL 2 TIMES DAILY
Qty: 180 TABLET | Refills: 3 | Status: SHIPPED | OUTPATIENT
Start: 2019-05-17 | End: 2020-07-11

## 2019-06-03 PROBLEM — E11.9 TYPE 2 DIABETES MELLITUS WITHOUT COMPLICATION, WITHOUT LONG-TERM CURRENT USE OF INSULIN (HCC): Status: ACTIVE | Noted: 2019-06-03

## 2019-06-16 ENCOUNTER — OFFICE VISIT (OUTPATIENT)
Dept: FAMILY MEDICINE CLINIC | Facility: CLINIC | Age: 74
End: 2019-06-16
Payer: MEDICARE

## 2019-06-16 VITALS
RESPIRATION RATE: 16 BRPM | DIASTOLIC BLOOD PRESSURE: 80 MMHG | BODY MASS INDEX: 22.66 KG/M2 | WEIGHT: 136 LBS | HEIGHT: 65 IN | TEMPERATURE: 98 F | OXYGEN SATURATION: 97 % | HEART RATE: 74 BPM | SYSTOLIC BLOOD PRESSURE: 140 MMHG

## 2019-06-16 DIAGNOSIS — J34.89 SINUS PRESSURE: Primary | ICD-10-CM

## 2019-06-16 DIAGNOSIS — S09.93XA FACIAL TRAUMA, INITIAL ENCOUNTER: ICD-10-CM

## 2019-06-16 PROCEDURE — 99213 OFFICE O/P EST LOW 20 MIN: CPT | Performed by: NURSE PRACTITIONER

## 2019-06-16 RX ORDER — DOXYCYCLINE HYCLATE 100 MG/1
100 CAPSULE ORAL 2 TIMES DAILY
Qty: 14 CAPSULE | Refills: 0 | Status: SHIPPED | OUTPATIENT
Start: 2019-06-16 | End: 2019-06-23

## 2019-06-16 NOTE — PATIENT INSTRUCTIONS
·  PLAN: Doxycycline, take as directed. Finish all the medication even if you feel better. · Avoid sun exposure during use of antibiotic to prevent sunburn. · Salt water gargles (1 tsp.  Salt in 6 oz lukewarm water), use several times daily to help decre any part of the body  · Seizures  General care  · If you were prescribed medicines for pain, use them as directed.  Note: Don’t take other medicines for pain without talking to your provider first.  · To help reduce swelling and pain, apply a cold source to follow your healthcare professional's instructions.
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
## 2019-06-20 ENCOUNTER — OFFICE VISIT (OUTPATIENT)
Dept: INTERNAL MEDICINE CLINIC | Facility: CLINIC | Age: 74
End: 2019-06-20
Payer: MEDICARE

## 2019-06-20 VITALS
HEIGHT: 65 IN | OXYGEN SATURATION: 95 % | BODY MASS INDEX: 22.74 KG/M2 | WEIGHT: 136.5 LBS | HEART RATE: 73 BPM | RESPIRATION RATE: 16 BRPM | TEMPERATURE: 99 F | DIASTOLIC BLOOD PRESSURE: 78 MMHG | SYSTOLIC BLOOD PRESSURE: 128 MMHG
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
## 2019-06-20 DIAGNOSIS — G44.309 INTERMITTENT POST-TRAUMATIC HEADACHE: ICD-10-CM

## 2019-06-20 DIAGNOSIS — H53.8 BLURRED VISION: Primary | ICD-10-CM

## 2019-06-20 PROCEDURE — 99213 OFFICE O/P EST LOW 20 MIN: CPT | Performed by: INTERNAL MEDICINE
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
## 2019-06-21 ENCOUNTER — TELEPHONE (OUTPATIENT)
Dept: INTERNAL MEDICINE CLINIC | Facility: CLINIC | Age: 74
End: 2019-06-21

## 2019-07-11 NOTE — TELEPHONE ENCOUNTER
Called patient to see if she needs this refill or if the pharmacy automatically sent a request.  Ester    These Rx were distributed in April 2019.

## 2019-07-18 ENCOUNTER — OFFICE VISIT (OUTPATIENT)
Dept: INTERNAL MEDICINE CLINIC | Facility: CLINIC | Age: 74
End: 2019-07-18
Payer: MEDICARE

## 2019-07-18 VITALS
RESPIRATION RATE: 16 BRPM | HEART RATE: 75 BPM | OXYGEN SATURATION: 98 % | BODY MASS INDEX: 22.33 KG/M2 | HEIGHT: 65 IN | WEIGHT: 134 LBS | TEMPERATURE: 98 F | DIASTOLIC BLOOD PRESSURE: 70 MMHG | SYSTOLIC BLOOD PRESSURE: 120 MMHG

## 2019-07-18 DIAGNOSIS — I10 ESSENTIAL HYPERTENSION: Primary | ICD-10-CM

## 2019-07-18 DIAGNOSIS — R39.15 URINARY URGENCY: ICD-10-CM

## 2019-07-18 DIAGNOSIS — E11.65 UNCONTROLLED TYPE 2 DIABETES MELLITUS WITH HYPERGLYCEMIA (HCC): ICD-10-CM

## 2019-07-18 LAB
APPEARANCE: CLEAR
BILIRUBIN: NEGATIVE
GLUCOSE (URINE DIPSTICK): 500 MG/DL
KETONES (URINE DIPSTICK): NEGATIVE MG/DL
LEUKOCYTES: NEGATIVE
MULTISTIX LOT#: NORMAL NUMERIC
NITRITE, URINE: NEGATIVE
OCCULT BLOOD: NEGATIVE
PH, URINE: 6 (ref 4.5–8)
PROTEIN (URINE DIPSTICK): NEGATIVE MG/DL
SPECIFIC GRAVITY: 1.01 (ref 1–1.03)
URINE-COLOR: YELLOW
UROBILINOGEN,SEMI-QN: 0.2 MG/DL (ref 0–1.9)

## 2019-07-18 PROCEDURE — 87086 URINE CULTURE/COLONY COUNT: CPT | Performed by: INTERNAL MEDICINE

## 2019-07-18 PROCEDURE — 87186 SC STD MICRODIL/AGAR DIL: CPT | Performed by: INTERNAL MEDICINE

## 2019-07-18 PROCEDURE — 87088 URINE BACTERIA CULTURE: CPT | Performed by: INTERNAL MEDICINE

## 2019-07-18 PROCEDURE — 81003 URINALYSIS AUTO W/O SCOPE: CPT | Performed by: INTERNAL MEDICINE

## 2019-07-18 PROCEDURE — 99213 OFFICE O/P EST LOW 20 MIN: CPT | Performed by: INTERNAL MEDICINE

## 2019-07-18 RX ORDER — CALCIUM CITRATE/VITAMIN D3 200MG-6.25
TABLET ORAL
Qty: 200 STRIP | Refills: 3 | Status: SHIPPED | OUTPATIENT
Start: 2019-07-18 | End: 2021-01-11

## 2019-07-18 RX ORDER — DIPHENHYDRAMINE HCL 25 MG
TABLET ORAL
Qty: 1 KIT | Refills: 0 | OUTPATIENT
Start: 2019-07-18

## 2019-07-18 RX ORDER — GLIMEPIRIDE 4 MG/1
4 TABLET ORAL 2 TIMES DAILY
Qty: 180 TABLET | Refills: 3 | Status: SHIPPED | OUTPATIENT
Start: 2019-07-18 | End: 2020-04-27

## 2019-07-18 RX ORDER — ISOPROPYL ALCOHOL 0.75 G/1
SWAB TOPICAL
Qty: 200 EACH | Refills: 3 | Status: SHIPPED | OUTPATIENT
Start: 2019-07-18
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
## 2019-07-21 RX ORDER — SULFAMETHOXAZOLE AND TRIMETHOPRIM 800; 160 MG/1; MG/1
1 TABLET ORAL 2 TIMES DAILY
Qty: 14 TABLET | Refills: 0 | Status: SHIPPED | OUTPATIENT
Start: 2019-07-21 | End: 2019-11-17

## 2019-08-09 ENCOUNTER — TELEPHONE (OUTPATIENT)
Dept: INTERNAL MEDICINE CLINIC | Facility: CLINIC | Age: 74
End: 2019-08-09

## 2019-08-09 RX ORDER — AMLODIPINE BESYLATE 5 MG/1
5 TABLET ORAL DAILY
Qty: 90 TABLET | Refills: 3 | OUTPATIENT
Start: 2019-08-09

## 2019-11-17 ENCOUNTER — OFFICE VISIT (OUTPATIENT)
Dept: FAMILY MEDICINE CLINIC | Facility: CLINIC | Age: 74
End: 2019-11-17
Payer: MEDICARE

## 2019-11-17 VITALS
OXYGEN SATURATION: 96 % | WEIGHT: 135 LBS | HEIGHT: 65 IN | HEART RATE: 77 BPM | DIASTOLIC BLOOD PRESSURE: 80 MMHG | SYSTOLIC BLOOD PRESSURE: 130 MMHG | BODY MASS INDEX: 22.49 KG/M2 | RESPIRATION RATE: 16 BRPM | TEMPERATURE: 98 F

## 2019-11-17 DIAGNOSIS — Z87.01 HISTORY OF PNEUMONIA: ICD-10-CM

## 2019-11-17 DIAGNOSIS — R05.9 COUGH: ICD-10-CM

## 2019-11-17 DIAGNOSIS — J01.40 ACUTE PANSINUSITIS, RECURRENCE NOT SPECIFIED: Primary | ICD-10-CM

## 2019-11-17 PROCEDURE — 99213 OFFICE O/P EST LOW 20 MIN: CPT | Performed by: NURSE PRACTITIONER

## 2019-11-17 RX ORDER — DOXYCYCLINE HYCLATE 100 MG
100 TABLET ORAL 2 TIMES DAILY
Qty: 14 TABLET | Refills: 0 | Status: SHIPPED | OUTPATIENT
Start: 2019-11-17 | End: 2019-11-24

## 2019-11-17 RX ORDER — LIRAGLUTIDE 6 MG/ML
INJECTION SUBCUTANEOUS
COMMUNITY
Start: 2019-08-23 | End: 2019-12-06

## 2019-11-17 NOTE — PATIENT INSTRUCTIONS
Humidifier in room  Sleep propped  Push fluids  Limit dairy  Mucinex as directed  Follow up with PCP or Immediate care if worsening symptoms or no improvement in 3 days    Sinusitis (Antibiotic Treatment)    The sinuses are air-filled spaces within the b · You can use an over-the-counter decongestant, unless a similar medicine was prescribed to you. Nasal sprays work the fastest. Use one that contains phenylephrine or oxymetazoline. First blow your nose gently. Then use the spray.  Do not use these medicine · Don’t have close contact with people who have sore throats, colds, or other upper respiratory infections. · Don’t smoke, and stay away from secondhand smoke. · Stay up to date with of your vaccines.   Date Last Reviewed: 11/1/2017  © 1050-9328 [employer]

## 2019-11-17 NOTE — PROGRESS NOTES
CHIEF COMPLAINT:   Patient presents with:  Cough: s/s for 2 weeks. Chest Congestion      HPI:   Jaren Munoz is a 69year old female who presents for cold symptoms for  2  weeks.  Symptoms have progressed into sinus congestion and been worsening sin • TRUEPLUS LANCETS 33G Does not apply Misc 1 lancet by Finger stick route 2 (two) times daily. Use as directed. DX E11.65 2 Box 0   • Blood Glucose Calibration (TRUE METRIX LEVEL 1) Low In Vitro Solution 1 Bottle by In Vitro route daily.  1 each 0   • Blood NECK: supple, non-tender  LUNGS: Breathing is non labored. Lungs clear to auscultation bilaterally, no wheezes or rhonchi. CARDIO: RRR without murmur  EXTREMITIES: no cyanosis, clubbing or edema  LYMPH:  + anterior cervical lymphadenopathy.         ASSESS · Drink plenty of water, hot tea, and other liquids. This may help thin nasal mucus. It also may help your sinuses drain fluids. · Heat may help soothe painful areas of your face. Use a towel soaked in hot water.  Or,  the shower and direct the war Call your healthcare provider if any of these occur:  · Facial pain or headache that gets worse  · Stiff neck  · Unusual drowsiness or confusion  · Swelling of your forehead or eyelids  · Vision problems, such as blurred or double vision  · Fever of 100.4º

## 2020-03-02 ENCOUNTER — TELEPHONE (OUTPATIENT)
Dept: INTERNAL MEDICINE CLINIC | Facility: CLINIC | Age: 75
End: 2020-03-02

## 2020-03-16 ENCOUNTER — TELEPHONE (OUTPATIENT)
Dept: INTERNAL MEDICINE CLINIC | Facility: CLINIC | Age: 75
End: 2020-03-16

## 2020-03-16 RX ORDER — SULFAMETHOXAZOLE AND TRIMETHOPRIM 800; 160 MG/1; MG/1
1 TABLET ORAL 2 TIMES DAILY
Qty: 14 TABLET | Refills: 0 | Status: SHIPPED | OUTPATIENT
Start: 2020-03-16 | End: 2020-04-27

## 2020-03-16 NOTE — TELEPHONE ENCOUNTER
Pt c/o urgency, burning with urination, bladder spasms, frequent small amounts of urine, and slight back pain. Pt denies fever, odor, abnormal urine color, or hematuria. Pt is drinking plenty of water.  Pt was referred to see uro previously however did not

## 2020-03-16 NOTE — TELEPHONE ENCOUNTER
Let her know due to coronavirus concerns, we are not seeing non-emergent cases in the office and therefore will send bactrim to pharmacy. She should call us if symptoms are not resolved by 72 hours.      (note: previous resistance to nitrofurantoin; intoler

## 2020-03-16 NOTE — TELEPHONE ENCOUNTER
Patient calling she feels she has a UTI; experiences frequently and has had symptoms for a week; can we call something into [facility] on [address]?  Please callback to let her know

## 2020-04-27 PROBLEM — E78.2 MIXED HYPERLIPIDEMIA DUE TO TYPE 2 DIABETES MELLITUS (HCC): Status: ACTIVE | Noted: 2020-04-27

## 2020-04-27 PROBLEM — E11.69 MIXED HYPERLIPIDEMIA DUE TO TYPE 2 DIABETES MELLITUS  (HCC): Status: ACTIVE | Noted: 2020-04-27

## 2020-04-27 PROBLEM — E11.69 MIXED HYPERLIPIDEMIA DUE TO TYPE 2 DIABETES MELLITUS: Status: ACTIVE | Noted: 2020-04-27

## 2020-04-27 PROBLEM — E11.69 MIXED HYPERLIPIDEMIA DUE TO TYPE 2 DIABETES MELLITUS (HCC): Status: ACTIVE | Noted: 2020-04-27

## 2020-04-27 PROBLEM — E78.2 MIXED HYPERLIPIDEMIA DUE TO TYPE 2 DIABETES MELLITUS  (HCC): Status: ACTIVE | Noted: 2020-04-27

## 2020-04-27 PROBLEM — E78.2 MIXED HYPERLIPIDEMIA DUE TO TYPE 2 DIABETES MELLITUS: Status: ACTIVE | Noted: 2020-04-27

## 2020-05-16 ENCOUNTER — HOSPITAL ENCOUNTER (OUTPATIENT)
Age: 75
Discharge: HOME | End: 2020-05-16
Attending: FAMILY MEDICINE
Payer: MEDICARE

## 2020-05-16 VITALS
RESPIRATION RATE: 20 BRPM | TEMPERATURE: 98 F | HEART RATE: 74 BPM | DIASTOLIC BLOOD PRESSURE: 60 MMHG | OXYGEN SATURATION: 95 % | SYSTOLIC BLOOD PRESSURE: 116 MMHG

## 2020-05-16 DIAGNOSIS — R81 ELEVATED SERUM GLUCOSE WITH GLUCOSURIA: ICD-10-CM

## 2020-05-16 DIAGNOSIS — R73.09 ELEVATED SERUM GLUCOSE WITH GLUCOSURIA: ICD-10-CM

## 2020-05-16 DIAGNOSIS — N30.01 ACUTE CYSTITIS WITH HEMATURIA: Primary | ICD-10-CM

## 2020-05-16 PROCEDURE — 82962 GLUCOSE BLOOD TEST: CPT

## 2020-05-16 PROCEDURE — 87086 URINE CULTURE/COLONY COUNT: CPT | Performed by: FAMILY MEDICINE

## 2020-05-16 PROCEDURE — 99214 OFFICE O/P EST MOD 30 MIN: CPT

## 2020-05-16 PROCEDURE — 81002 URINALYSIS NONAUTO W/O SCOPE: CPT | Performed by: FAMILY MEDICINE

## 2020-05-16 PROCEDURE — 99204 OFFICE O/P NEW MOD 45 MIN: CPT

## 2020-05-16 RX ORDER — SULFAMETHOXAZOLE AND TRIMETHOPRIM 800; 160 MG/1; MG/1
1 TABLET ORAL 2 TIMES DAILY
Qty: 14 TABLET | Refills: 0 | Status: SHIPPED | OUTPATIENT
Start: 2020-05-16 | End: 2020-05-23

## 2020-05-16 NOTE — ED INITIAL ASSESSMENT (HPI)
Pain with urination - started yesterday, took AZO  Yesterday and today. Denies fever. Also c/o frequency.   Pt has h/o of being admitted due to UTI

## 2020-05-16 NOTE — ED PROVIDER NOTES
Patient Seen in: [facility]      History   Patient presents with:  Urinary Symptoms    Stated Complaint: UTI X 1 DAY    HPI    71-year-old female with history of [employer] pneumonia, hypertension, hypothyroidism, and recurrent lymphadenopathy. Heart: S1,S2 RRR, No murmur  Lungs: CTA bilateral. No wheezes rales or rhonchi. Abd: +BS. soft. NT. No rebound. No guarding. Neg Yang's, Neg McBurney's, Neg Rovsing. No mass. No CVAT to percussion Alexandru  Neuro: A&O x3.  No focal deficit

## 2020-07-11 RX ORDER — METOPROLOL TARTRATE 50 MG/1
50 TABLET, FILM COATED ORAL 2 TIMES DAILY
Qty: 180 TABLET | Refills: 1 | Status: SHIPPED | OUTPATIENT
Start: 2020-07-11 | End: 2021-01-11

## 2020-07-11 RX ORDER — AMLODIPINE BESYLATE 5 MG/1
5 TABLET ORAL DAILY
Qty: 90 TABLET | Refills: 1 | Status: SHIPPED | OUTPATIENT
Start: 2020-07-11 | End: 2021-01-11

## 2020-07-23 ENCOUNTER — OFFICE VISIT (OUTPATIENT)
Dept: INTERNAL MEDICINE CLINIC | Facility: CLINIC | Age: 75
End: 2020-07-23
Payer: MEDICARE

## 2020-07-23 VITALS
HEIGHT: 64 IN | RESPIRATION RATE: 14 BRPM | TEMPERATURE: 98 F | SYSTOLIC BLOOD PRESSURE: 126 MMHG | WEIGHT: 135.5 LBS | HEART RATE: 86 BPM | DIASTOLIC BLOOD PRESSURE: 78 MMHG | BODY MASS INDEX: 23.13 KG/M2 | OXYGEN SATURATION: 97 %

## 2020-07-23 DIAGNOSIS — E78.2 MIXED HYPERLIPIDEMIA DUE TO TYPE 2 DIABETES MELLITUS (HCC): ICD-10-CM

## 2020-07-23 DIAGNOSIS — Z12.31 ENCOUNTER FOR SCREENING MAMMOGRAM FOR BREAST CANCER: ICD-10-CM

## 2020-07-23 DIAGNOSIS — E11.9 TYPE 2 DIABETES MELLITUS WITHOUT COMPLICATION, WITHOUT LONG-TERM CURRENT USE OF INSULIN (HCC): ICD-10-CM

## 2020-07-23 DIAGNOSIS — Z63.4 BEREAVEMENT: ICD-10-CM

## 2020-07-23 DIAGNOSIS — E03.8 HYPOTHYROIDISM DUE TO HASHIMOTO'S THYROIDITIS: ICD-10-CM

## 2020-07-23 DIAGNOSIS — E11.69 MIXED HYPERLIPIDEMIA DUE TO TYPE 2 DIABETES MELLITUS (HCC): ICD-10-CM

## 2020-07-23 DIAGNOSIS — R30.0 DYSURIA: ICD-10-CM

## 2020-07-23 DIAGNOSIS — I10 ESSENTIAL HYPERTENSION: ICD-10-CM

## 2020-07-23 DIAGNOSIS — E06.3 HYPOTHYROIDISM DUE TO HASHIMOTO'S THYROIDITIS: ICD-10-CM

## 2020-07-23 DIAGNOSIS — Z00.00 WELLNESS EXAMINATION: Primary | ICD-10-CM

## 2020-07-23 LAB
APPEARANCE: CLEAR
GLUCOSE (URINE DIPSTICK): NEGATIVE MG/DL
KETONES (URINE DIPSTICK): NEGATIVE MG/DL
MULTISTIX LOT#: NORMAL NUMERIC
NITRITE, URINE: NEGATIVE
PH, URINE: 5.5 (ref 4.5–8)
PROTEIN (URINE DIPSTICK): NEGATIVE MG/DL
SPECIFIC GRAVITY: 1.02 (ref 1–1.03)
URINE-COLOR: YELLOW
UROBILINOGEN,SEMI-QN: 0.2 MG/DL (ref 0–1.9)

## 2020-07-23 PROCEDURE — 87086 URINE CULTURE/COLONY COUNT: CPT | Performed by: INTERNAL MEDICINE

## 2020-07-23 PROCEDURE — G0439 PPPS, SUBSEQ VISIT: HCPCS | Performed by: INTERNAL MEDICINE

## 2020-07-23 PROCEDURE — 99397 PER PM REEVAL EST PAT 65+ YR: CPT | Performed by: INTERNAL MEDICINE

## 2020-07-23 PROCEDURE — 3008F BODY MASS INDEX DOCD: CPT | Performed by: INTERNAL MEDICINE

## 2020-07-23 PROCEDURE — 3074F SYST BP LT 130 MM HG: CPT | Performed by: INTERNAL MEDICINE

## 2020-07-23 PROCEDURE — 81003 URINALYSIS AUTO W/O SCOPE: CPT | Performed by: INTERNAL MEDICINE

## 2020-07-23 PROCEDURE — 96160 PT-FOCUSED HLTH RISK ASSMT: CPT | Performed by: INTERNAL MEDICINE

## 2020-07-23 PROCEDURE — 3078F DIAST BP <80 MM HG: CPT | Performed by: INTERNAL MEDICINE

## 2020-07-23 RX ORDER — QUINAPRIL 40 MG/1
40 TABLET ORAL DAILY
Qty: 90 TABLET | Refills: 3 | Status: SHIPPED | OUTPATIENT
Start: 2020-07-23 | End: 2021-11-05

## 2020-07-23 SDOH — SOCIAL STABILITY - SOCIAL INSECURITY: DISSAPEARANCE AND DEATH OF FAMILY MEMBER: Z63.4

## 2020-07-23 NOTE — PROGRESS NOTES
REASON FOR VISIT:    Danielito Musa is a 77year old female who presents for a MA Supervisit.     She has had sudden urges to urinate and has had painful spasms    She was prescribed abx in 5/2020 for similar symptoms but urine culture at that time was n daily. 180 tablet 3   • Levothyroxine Sodium 50 MCG Oral Tab Take 1 tablet (50 mcg total) by mouth daily. 90 tablet 1   • metFORMIN HCl 1000 MG Oral Tab Take 1 tablet (1,000 mg total) by mouth 2 (two) times daily with meals.  180 tablet 3   • Lio Gonzalez activity?: Light  How would you describe your current health state?: Good  How do you maintain positive mental well-being?: Social Interaction; Visiting Friends; Visiting Family  If you are a male age 38-65 or a female age 47-67, do you take aspirin?: Yes  H Correct  Recall \"Tree\": Correct         PREVENTATIVE SERVICES   INDICATIONS AND SCHEDULE Internal Lab or Procedure   Diabetes Screening     HbgA1C   Annually HEMOGLOBIN A1C (%)   Date Value   04/27/2020 6.7 (A)     HbA1c (%)   Date Value   07/22/2020 6. 9 Dry skin 3/7/2019   • Essential hypertension    • Hypothyroidism    • Toe problem 3/7/2019      Past Surgical History:   Procedure Laterality Date   • HYSTERECTOMY     • REMOVAL GALLBLADDER        Family History   Problem Relation Age of Onset   • Hyperten clubbing or edema  NEURO: Oriented times three, cranial nerves are intact, motor and sensory are grossly intact      ASSESSMENT AND OTHER RELEVANT CHRONIC CONDITIONS:   Kavita Savage is a 77year old female who presents for a Medicare Assessment.      P as above. The patient indicates understanding of these issues and agrees to the plan.   The patient is asked to return in 6 months for medication check  Exercise counseling perfomed    SUGGESTED VACCINATIONS - Influenza, Pneumococcal, Zoster, Tetanus

## 2020-07-24 PROBLEM — IMO0002: Status: RESOLVED | Noted: 2019-03-07 | Resolved: 2020-07-24

## 2020-07-24 PROBLEM — L85.3 DRY SKIN: Status: RESOLVED | Noted: 2019-03-07 | Resolved: 2020-07-24

## 2020-07-24 PROBLEM — Z63.4 BEREAVEMENT: Status: ACTIVE | Noted: 2020-07-24

## 2020-08-22 ENCOUNTER — HOSPITAL ENCOUNTER (OUTPATIENT)
Dept: MAMMOGRAPHY | Age: 75
Discharge: HOME | End: 2020-08-22
Attending: INTERNAL MEDICINE
Payer: MEDICARE

## 2020-08-22 DIAGNOSIS — Z12.31 ENCOUNTER FOR SCREENING MAMMOGRAM FOR BREAST CANCER: ICD-10-CM

## 2020-08-22 PROCEDURE — 77063 BREAST TOMOSYNTHESIS BI: CPT | Performed by: INTERNAL MEDICINE

## 2020-08-22 PROCEDURE — 77067 SCR MAMMO BI INCL CAD: CPT | Performed by: INTERNAL MEDICINE

## 2020-08-26 PROBLEM — N39.0 RECURRENT UTI: Status: ACTIVE | Noted: 2020-08-26

## 2021-01-07 ENCOUNTER — TELEPHONE (OUTPATIENT)
Dept: INTERNAL MEDICINE CLINIC | Facility: CLINIC | Age: 76
End: 2021-01-07

## 2021-01-11 RX ORDER — METOPROLOL TARTRATE 50 MG/1
TABLET, FILM COATED ORAL
Qty: 180 TABLET | Refills: 1 | Status: SHIPPED | OUTPATIENT
Start: 2021-01-11 | End: 2021-07-06

## 2021-01-11 RX ORDER — AMLODIPINE BESYLATE 5 MG/1
TABLET ORAL
Qty: 90 TABLET | Refills: 1 | Status: SHIPPED | OUTPATIENT
Start: 2021-01-11 | End: 2021-06-22

## 2021-03-08 DIAGNOSIS — Z23 NEED FOR VACCINATION: ICD-10-CM

## 2021-03-24 ENCOUNTER — IMMUNIZATION (OUTPATIENT)
Dept: LAB | Age: 76
End: 2021-03-24
Attending: HOSPITALIST
Payer: MEDICARE

## 2021-03-24 DIAGNOSIS — Z23 NEED FOR VACCINATION: Primary | ICD-10-CM

## 2021-03-24 PROCEDURE — 0001A SARSCOV2 VAC 30MCG/0.3ML IM: CPT

## 2021-04-14 ENCOUNTER — IMMUNIZATION (OUTPATIENT)
Dept: LAB | Age: 76
End: 2021-04-14
Attending: HOSPITALIST
Payer: MEDICARE

## 2021-04-14 DIAGNOSIS — Z23 NEED FOR VACCINATION: Primary | ICD-10-CM

## 2021-04-14 PROCEDURE — 0002A SARSCOV2 VAC 30MCG/0.3ML IM: CPT

## 2021-04-20 ENCOUNTER — OFFICE VISIT (OUTPATIENT)
Dept: INTERNAL MEDICINE CLINIC | Facility: CLINIC | Age: 76
End: 2021-04-20
Payer: MEDICARE

## 2021-04-20 VITALS
SYSTOLIC BLOOD PRESSURE: 138 MMHG | OXYGEN SATURATION: 96 % | HEIGHT: 64.5 IN | RESPIRATION RATE: 16 BRPM | BODY MASS INDEX: 23.04 KG/M2 | DIASTOLIC BLOOD PRESSURE: 80 MMHG | TEMPERATURE: 98 F | WEIGHT: 136.63 LBS | HEART RATE: 86 BPM

## 2021-04-20 DIAGNOSIS — I10 ESSENTIAL HYPERTENSION: ICD-10-CM

## 2021-04-20 DIAGNOSIS — E11.9 TYPE 2 DIABETES MELLITUS WITHOUT COMPLICATION, WITHOUT LONG-TERM CURRENT USE OF INSULIN (HCC): ICD-10-CM

## 2021-04-20 DIAGNOSIS — F43.29 STRESS AND ADJUSTMENT REACTION: ICD-10-CM

## 2021-04-20 DIAGNOSIS — E03.9 HYPOTHYROIDISM, UNSPECIFIED TYPE: ICD-10-CM

## 2021-04-20 DIAGNOSIS — Z00.00 WELLNESS EXAMINATION: Primary | ICD-10-CM

## 2021-04-20 DIAGNOSIS — E78.2 MIXED HYPERLIPIDEMIA DUE TO TYPE 2 DIABETES MELLITUS (HCC): ICD-10-CM

## 2021-04-20 DIAGNOSIS — E11.69 MIXED HYPERLIPIDEMIA DUE TO TYPE 2 DIABETES MELLITUS (HCC): ICD-10-CM

## 2021-04-20 DIAGNOSIS — R53.83 FATIGUE, UNSPECIFIED TYPE: ICD-10-CM

## 2021-04-20 PROCEDURE — 3008F BODY MASS INDEX DOCD: CPT | Performed by: INTERNAL MEDICINE

## 2021-04-20 PROCEDURE — 3075F SYST BP GE 130 - 139MM HG: CPT | Performed by: INTERNAL MEDICINE

## 2021-04-20 PROCEDURE — G0439 PPPS, SUBSEQ VISIT: HCPCS | Performed by: INTERNAL MEDICINE

## 2021-04-20 PROCEDURE — 3079F DIAST BP 80-89 MM HG: CPT | Performed by: INTERNAL MEDICINE

## 2021-04-20 PROCEDURE — 96160 PT-FOCUSED HLTH RISK ASSMT: CPT | Performed by: INTERNAL MEDICINE

## 2021-04-20 PROCEDURE — 99397 PER PM REEVAL EST PAT 65+ YR: CPT | Performed by: INTERNAL MEDICINE

## 2021-04-20 NOTE — PROGRESS NOTES
REASON FOR VISIT:    Luda York is a 77year old female who presents for a MA Supervisit.     She received her covid vaccine     She notes her vision has been less and she can't see with her glasses and needs ophtho referral     Hypothyroidism - on l total) by mouth daily. 90 tablet 3   • Blood Glucose Calibration (TRUE METRIX LEVEL 3) High In Vitro Solution 1 Bottle by In Vitro route daily.  100 each 3   • Alcohol Swabs (BD SWAB SINGLE USE REGULAR) Does not apply Pads USE AS DIRECTED 200 each 3   • Ins help  Toileting: Able without help  Dressing: Able without help  Eating: Able without help  Driving: Able without help  Preparing your meals: Able without help  Managing money/bills: Able without help  Taking medications as prescribed: Able without help  A Initial Preventative Physical Exam only, or if medically necessary n/a   Colorectal Cancer Screening     Colonoscopy Screen every 10 years Colonoscopy Never done    Flex Sigmoidoscopy Screen every 5 years No results found for this or any previous visit. 03/24/2021 04/14/2021      Tb Intradermal Test   10/27/2015        SOCIAL HISTORY:   Social History    Tobacco Use      Smoking status: Never Smoker      Smokeless tobacco: Never Used    Vaping Use      Vaping Use: Never used    Alcohol u SUMMARY:   Diagnoses and all orders for this visit:    Wellness examination     Type 2 diabetes mellitus without complication, without long-term current use of insulin ([facility] - due for A1c, sees endocrinology   -     MICROALB/CREAT RATIO, RANDOM URINE;  Futu

## 2021-04-21 PROBLEM — Z63.4 BEREAVEMENT: Status: RESOLVED | Noted: 2020-07-24 | Resolved: 2021-04-21

## 2021-05-07 ENCOUNTER — TELEPHONE (OUTPATIENT)
Dept: INTERNAL MEDICINE CLINIC | Facility: CLINIC | Age: 76
End: 2021-05-07

## 2021-05-07 DIAGNOSIS — E11.9 TYPE 2 DIABETES MELLITUS WITHOUT COMPLICATION, WITHOUT LONG-TERM CURRENT USE OF INSULIN (HCC): Primary | ICD-10-CM

## 2021-05-07 NOTE — TELEPHONE ENCOUNTER
Referral pending if appropriate. Do you recommend anyone specifically from [facility]? [address]! no

## 2021-05-07 NOTE — TELEPHONE ENCOUNTER
Patient made aware on new referral. Patient stated that she will contact office or insurance to ensure coverage prior to making appt.

## 2021-05-07 NOTE — TELEPHONE ENCOUNTER
Let her know I'm not sure that [facility] is in network but they would be able to let her know. Dr. Ericka Luque or any of the other ophthalmologists would be fine. Deirdre or [facility].  [phone]

## 2021-05-07 NOTE — TELEPHONE ENCOUNTER
Pt advd that Dr Olivia Tomlinson office wont be able to get her in for her eye exam for another 3 months due to referral issues with [facility] that the eye dr office is having. Pt is requesting any other recommendations to be seen sooner.

## 2021-06-11 ENCOUNTER — TELEPHONE (OUTPATIENT)
Dept: INTERNAL MEDICINE CLINIC | Facility: CLINIC | Age: 76
End: 2021-06-11

## 2021-06-11 NOTE — TELEPHONE ENCOUNTER
Pt c/o soreness/tenderness to the calf muscle near the ankle x 1 month. States pain is only with movement/activity. Did trip and fall on 4/3/2021 but believe this to be unrelated to pain. States it feels like a pulling sensation.     Denies redness, swellin

## 2021-06-11 NOTE — TELEPHONE ENCOUNTER
Pt stated her right leg is swollen around her knee area and is having a hard time walking. Pt has been taking motrin and ibuprofen for pain.      Pt is requesting a call back from the nurse for advice on if she should go to the ER or if she should come in o

## 2021-06-14 ENCOUNTER — OFFICE VISIT (OUTPATIENT)
Dept: INTERNAL MEDICINE CLINIC | Facility: CLINIC | Age: 76
End: 2021-06-14
Payer: MEDICARE

## 2021-06-14 VITALS
RESPIRATION RATE: 16 BRPM | OXYGEN SATURATION: 96 % | BODY MASS INDEX: 22.6 KG/M2 | DIASTOLIC BLOOD PRESSURE: 82 MMHG | SYSTOLIC BLOOD PRESSURE: 122 MMHG | HEIGHT: 64.5 IN | TEMPERATURE: 98 F | WEIGHT: 134 LBS | HEART RATE: 86 BPM

## 2021-06-14 DIAGNOSIS — I10 ESSENTIAL HYPERTENSION: ICD-10-CM

## 2021-06-14 DIAGNOSIS — M79.604 RIGHT LEG PAIN: Primary | ICD-10-CM

## 2021-06-14 DIAGNOSIS — E11.9 TYPE 2 DIABETES MELLITUS WITHOUT COMPLICATION, WITHOUT LONG-TERM CURRENT USE OF INSULIN (HCC): ICD-10-CM

## 2021-06-14 DIAGNOSIS — F43.9 STRESS: ICD-10-CM

## 2021-06-14 PROCEDURE — 3008F BODY MASS INDEX DOCD: CPT | Performed by: INTERNAL MEDICINE

## 2021-06-14 PROCEDURE — 3074F SYST BP LT 130 MM HG: CPT | Performed by: INTERNAL MEDICINE

## 2021-06-14 PROCEDURE — 3079F DIAST BP 80-89 MM HG: CPT | Performed by: INTERNAL MEDICINE

## 2021-06-14 PROCEDURE — 99214 OFFICE O/P EST MOD 30 MIN: CPT | Performed by: INTERNAL MEDICINE

## 2021-06-14 NOTE — PATIENT INSTRUCTIONS
Please see your therapist  I have referred you to our orthopedic surgeon.  If he does not think it is a pull, we can have the ultrasound done

## 2021-06-14 NOTE — PROGRESS NOTES
Patient Office Visit    ASSESSMENT AND PLAN:   (M79.604) Right leg pain  (primary encounter diagnosis)  Plan: ORTHOPEDIC - INTERNAL, US VENOUS DOPPLER LEG         RIGHT - DIAG IMG (CPT=93971)  Note: less likely to be a Deep venous thrombosis or superficial pain    1.  Right leg pain:   - started about 6 weeks ago   - she fell 1.5 months prior to the symptoms   - no new falls   - has difficulty walking as it is painful to bend the leg   - motrin helps relieve the pain and takes 400 mg twice a day   - no sonui daily., Disp: 90 each, Rfl: 1  LEVOTHYROXINE SODIUM 50 MCG Oral Tab, TAKE 1 TABLET (50 MCG TOTAL) BY MOUTH DAILY. , Disp: 90 tablet, Rfl: 1  Liraglutide (VICTOZA) 18 MG/3ML Subcutaneous Solution Pen-injector, INJECT 1.8MG SUBCUTANEOUSLY EVERY DAY, Disp: 27 96%   BMI 22.65 kg/m²     PHYSICAL EXAM:   Constitutional: Vital signs reviewed as noted, well developed, in no acute distress.    HENT: NCAT  Eyes: pupils reactive bilaterally  Cardiovascular: nl s1 s2 no m/r/g  Pulmonary/Chest: CTA bilaterally with no whe

## 2021-06-16 ENCOUNTER — TELEPHONE (OUTPATIENT)
Dept: INTERNAL MEDICINE CLINIC | Facility: CLINIC | Age: 76
End: 2021-06-16

## 2021-06-16 NOTE — TELEPHONE ENCOUNTER
Pt is requesting to talk to dr. Delicia Jeter or the triage nurse regarding her R Leg, pt stated there is some issues she would like to further discuss with the nurse, pt did not want to elaborate any further unless is with the nurse.

## 2021-06-16 NOTE — TELEPHONE ENCOUNTER
Pt called to report right calf pain much better and able to move foot. Pt states she is very comfortable with less muscle soreness. Feels as though it may have been just a pulled muscle.   States the Motrin she was taking for about 14 days made her tense

## 2021-06-16 NOTE — TELEPHONE ENCOUNTER
If patient doing much better then no need to see ortho and no need for the ultrasound.     [address] [facility]

## 2021-06-22 RX ORDER — AMLODIPINE BESYLATE 5 MG/1
TABLET ORAL
Qty: 90 TABLET | Refills: 1 | Status: SHIPPED | OUTPATIENT
Start: 2021-06-22 | End: 2021-12-16

## 2021-07-06 RX ORDER — METOPROLOL TARTRATE 50 MG/1
TABLET, FILM COATED ORAL
Qty: 180 TABLET | Refills: 0 | Status: SHIPPED | OUTPATIENT
Start: 2021-07-06 | End: 2021-10-02

## 2021-07-06 NOTE — TELEPHONE ENCOUNTER
Name from pharmacy: Metoprolol Tartrate 50 Mg Tab Yessy          Will file in chart as: METOPROLOL TARTRATE 50 MG Oral Tab    Sig: TAKE ONE TABLET BY MOUTH TWICE PER DAY     Original sig: TAKE ONE TABLET BY MOUTH TWICE PER DAY      Disp:  180 tablet    Refi

## 2021-08-25 PROCEDURE — 3051F HG A1C>EQUAL 7.0%<8.0%: CPT | Performed by: INTERNAL MEDICINE

## 2021-10-02 RX ORDER — METOPROLOL TARTRATE 50 MG/1
TABLET, FILM COATED ORAL
Qty: 180 TABLET | Refills: 3 | Status: SHIPPED | OUTPATIENT
Start: 2021-10-02

## 2021-10-02 NOTE — TELEPHONE ENCOUNTER
LOV: 6/14/2021 with Dr. Collin Farris  RTC: 1 week  Last Relevant Labs: 8/25/2021 (A1C)  Filled: 7/6/2021    #180 with 0 refills    Future Appointments   Date Time Provider Marco A Swani   10/22/2021  8:00 AM Josefina Jarrett MD [facility] [place]

## 2021-10-22 ENCOUNTER — OFFICE VISIT (OUTPATIENT)
Dept: INTERNAL MEDICINE CLINIC | Facility: CLINIC | Age: 76
End: 2021-10-22
Payer: MEDICARE

## 2021-10-22 VITALS
DIASTOLIC BLOOD PRESSURE: 84 MMHG | WEIGHT: 135.81 LBS | HEIGHT: 64.5 IN | SYSTOLIC BLOOD PRESSURE: 117 MMHG | OXYGEN SATURATION: 94 % | TEMPERATURE: 98 F | BODY MASS INDEX: 22.9 KG/M2 | RESPIRATION RATE: 16 BRPM | HEART RATE: 79 BPM

## 2021-10-22 DIAGNOSIS — Z12.31 SCREENING MAMMOGRAM FOR BREAST CANCER: ICD-10-CM

## 2021-10-22 DIAGNOSIS — Z23 NEED FOR INFLUENZA VACCINATION: ICD-10-CM

## 2021-10-22 DIAGNOSIS — E11.9 TYPE 2 DIABETES MELLITUS WITHOUT COMPLICATION, WITHOUT LONG-TERM CURRENT USE OF INSULIN (HCC): ICD-10-CM

## 2021-10-22 DIAGNOSIS — I10 ESSENTIAL HYPERTENSION: Primary | ICD-10-CM

## 2021-10-22 DIAGNOSIS — Z63.6 CAREGIVER BURDEN: ICD-10-CM

## 2021-10-22 PROCEDURE — 3074F SYST BP LT 130 MM HG: CPT | Performed by: INTERNAL MEDICINE

## 2021-10-22 PROCEDURE — 3008F BODY MASS INDEX DOCD: CPT | Performed by: INTERNAL MEDICINE

## 2021-10-22 PROCEDURE — 3079F DIAST BP 80-89 MM HG: CPT | Performed by: INTERNAL MEDICINE

## 2021-10-22 PROCEDURE — 99214 OFFICE O/P EST MOD 30 MIN: CPT | Performed by: INTERNAL MEDICINE

## 2021-10-22 SDOH — SOCIAL STABILITY - SOCIAL INSECURITY: DEPENDENT RELATIVE NEEDING CARE AT HOME: Z63.6

## 2021-10-22 NOTE — PATIENT INSTRUCTIONS
[email or web address]    To make an appointment for the COVID vaccine booster, or you can get at your local pharmacy   Doris [address], Gray \"Donaldo covid vaccine\"       [facility] [employer]

## 2021-10-22 NOTE — PROGRESS NOTES
[facility] Internal Medicine Office Note  Chief Complaint:   Patient presents with:  Diabetes: 6 month follow up; Due for mammo and colonoscopy   Immunization/Injection: Will like to discuss booster and flu vaccine ; Flagging for shingles vaccine by mouth 2 (two) times daily. 180 tablet 3   • Insulin Pen Needle (PEN NEEDLES) 32G X 4 MM Does not apply Misc 1 each by Does not apply route daily.  90 each 1   • LEVOTHYROXINE SODIUM 50 MCG Oral Tab TAKE 1 TABLET EVERY DAY 90 tablet 1   • AMLODIPINE BESYL reviewed. Appears stated age, well groomed. Physical Exam  Vitals reviewed. Constitutional:       General: She is not in acute distress. Appearance: She is well-developed. HENT:      Head: Normocephalic and atraumatic.    Eyes:      Conjunctiva/scl requested or ordered in this encounter       Imaging & Consults:  FLU VACC HIGH DOSE PRSV FREE  KEE VESTA 2D+3D SCREENING BILAT (CPT=77067/17024)    Pneumococcal Vaccine: 65+ Years(1 of 2 - PPSV23) Never done  Colonoscopy Never done  FIT/FOBT Colorectal Scr

## 2021-10-29 ENCOUNTER — HOSPITAL ENCOUNTER (OUTPATIENT)
Dept: MAMMOGRAPHY | Age: 76
Discharge: HOME | End: 2021-10-29
Attending: INTERNAL MEDICINE
Payer: MEDICARE

## 2021-10-29 DIAGNOSIS — Z12.31 SCREENING MAMMOGRAM FOR BREAST CANCER: ICD-10-CM

## 2021-10-29 PROCEDURE — 77063 BREAST TOMOSYNTHESIS BI: CPT | Performed by: INTERNAL MEDICINE

## 2021-10-29 PROCEDURE — 77067 SCR MAMMO BI INCL CAD: CPT | Performed by: INTERNAL MEDICINE

## 2021-11-03 ENCOUNTER — HOSPITAL ENCOUNTER (OUTPATIENT)
Dept: MAMMOGRAPHY | Age: 76
Discharge: HOME | End: 2021-11-03
Attending: INTERNAL MEDICINE
Payer: MEDICARE

## 2021-11-03 ENCOUNTER — TELEPHONE (OUTPATIENT)
Dept: INTERNAL MEDICINE CLINIC | Facility: CLINIC | Age: 76
End: 2021-11-03

## 2021-11-03 DIAGNOSIS — R92.8 ABNORMAL MAMMOGRAM: Primary | ICD-10-CM

## 2021-11-03 DIAGNOSIS — R92.2 INCONCLUSIVE MAMMOGRAM: ICD-10-CM

## 2021-11-03 PROCEDURE — 77065 DX MAMMO INCL CAD UNI: CPT | Performed by: INTERNAL MEDICINE

## 2021-11-03 PROCEDURE — 77061 BREAST TOMOSYNTHESIS UNI: CPT | Performed by: INTERNAL MEDICINE

## 2021-11-03 NOTE — IMAGING NOTE
Assisted Dr Yara Lozada with left breast biopsy recommendation, BIRADS 4b. Explained procedure and written instructions given. Pt screened and denies blood thinners, bleeding issues, chemo or liver disease.  Reviewed to eat, take 1000 mg of acetaminophen, and br

## 2021-11-03 NOTE — TELEPHONE ENCOUNTER
----- Message from Laxmi Rutherford MD sent at 11/3/2021  2:20 PM CDT -----  Regarding: CA  Hello I am recommending a left breast stereotactic biopsy for calcifications.     Thanks  Dr. Tamika Bhatia

## 2021-11-03 NOTE — TELEPHONE ENCOUNTER
Please let patient know I placed the order for biopsy in the system    (need for biopsy already discussed by radiology with patient)

## 2021-11-05 DIAGNOSIS — I10 ESSENTIAL HYPERTENSION: ICD-10-CM

## 2021-11-05 RX ORDER — QUINAPRIL 40 MG/1
40 TABLET ORAL DAILY
Qty: 90 TABLET | Refills: 3 | Status: SHIPPED | OUTPATIENT
Start: 2021-11-05

## 2021-11-05 NOTE — TELEPHONE ENCOUNTER
LOV: 10/22/2021 with Dr. Dax Valdovinos  RTC: 6 months  Last Relevant Labs: 8/25/2021 (A1C)  Filled: 7/23/2020    #90 with 3 refills    Future Appointments   Date Time Provider Marco A Olsen   11/30/2021 10:30 AM [facility] KEE BREAST BX [address] [facility]

## 2021-11-30 ENCOUNTER — HOSPITAL ENCOUNTER (OUTPATIENT)
Dept: MAMMOGRAPHY | Facility: HOSPITAL | Age: 76
Discharge: HOME | End: 2021-11-30
Attending: INTERNAL MEDICINE
Payer: MEDICARE

## 2021-11-30 DIAGNOSIS — R92.8 ABNORMAL MAMMOGRAM: ICD-10-CM

## 2021-11-30 PROCEDURE — 88305 TISSUE EXAM BY PATHOLOGIST: CPT | Performed by: INTERNAL MEDICINE

## 2021-11-30 PROCEDURE — 19499 UNLISTED PROCEDURE BREAST: CPT | Performed by: INTERNAL MEDICINE

## 2021-12-02 ENCOUNTER — TELEPHONE (OUTPATIENT)
Dept: MAMMOGRAPHY | Facility: HOSPITAL | Age: 76
End: 2021-12-02

## 2021-12-02 NOTE — TELEPHONE ENCOUNTER
Telephoned Melina Upton and name,  verified with patient. Notified Melina Upton of left breast negative for malignancy biopsy result. Concordance pending. Melina Upton reports biopsy site is healing well.   Radiologist recommends next mamm

## 2021-12-15 NOTE — TELEPHONE ENCOUNTER
Medication(s) to Refill:   Requested Prescriptions     Pending Prescriptions Disp Refills   • AMLODIPINE 5 MG Oral Tab [Pharmacy Med Name: Amlodipine Besylate 5 Mg Tab Unic] 90 tablet 0     Sig: TAKE ONE TABLET BY MOUTH DAILY.        LOV:  10-    RTC

## 2021-12-16 RX ORDER — AMLODIPINE BESYLATE 5 MG/1
TABLET ORAL
Qty: 90 TABLET | Refills: 3 | Status: SHIPPED | OUTPATIENT
Start: 2021-12-16

## 2021-12-30 ENCOUNTER — LAB ENCOUNTER (OUTPATIENT)
Dept: LAB | Age: 76
End: 2021-12-30
Attending: INTERNAL MEDICINE
Payer: MEDICARE

## 2021-12-30 ENCOUNTER — TELEPHONE (OUTPATIENT)
Dept: INTERNAL MEDICINE CLINIC | Facility: CLINIC | Age: 76
End: 2021-12-30

## 2021-12-30 DIAGNOSIS — Z11.52 ENCOUNTER FOR SCREENING FOR COVID-19: ICD-10-CM

## 2021-12-30 DIAGNOSIS — Z11.52 ENCOUNTER FOR SCREENING FOR COVID-19: Primary | ICD-10-CM

## 2021-12-30 NOTE — TELEPHONE ENCOUNTER
Pt c/o PND, runny nose, and sore throat. Denies fevers, chills, cough, congestion, N/V/D, SOB, and body aches. Denies any known exposure. Was around large group of people for holiday.  Patient states he family is concerned for her which is why she conta

## 2021-12-30 NOTE — TELEPHONE ENCOUNTER
Pt is calling because she has a \"sinus cold\" , post nasal drip, no fever, no aches and pains. She is vaccinated, no booster. Wants to speak to a nurse, she would like to know if she should get tested?

## 2022-01-02 LAB — SARS-COV-2 RNA RESP QL NAA+PROBE: DETECTED

## 2022-04-07 ENCOUNTER — TELEPHONE (OUTPATIENT)
Dept: INTERNAL MEDICINE CLINIC | Facility: CLINIC | Age: 77
End: 2022-04-07

## 2022-04-08 NOTE — TELEPHONE ENCOUNTER
Reached patient for medication adherence consult and to discuss statin use in diabetes. Discussed guidelines and recommendations for statin therapy in patients with diabetes. Patient tells me she is not interested in taking a statin a this time and states she doesn't have high cholesterol. Did provide education and discussed benefit of statin regardless of cholesterol in protecting patients with diabetes from heart attacks and strokes. She will discuss with PCP at future office visit. It does look like patient is due for an appt and strongly encouraged her to contact office to set that up as soon as able. Patient is also past due for refill on quinapril; 11/5/21 for 90 day supply. She tells me she is still taking this medication and when they switched to [facility] they received a double supply at one point. Strongly encouraged her to double check her supply and contact pharmacy if she it getting low. Patient confirmed understanding. Did provide education and stressed the importance of taking medication just like prescribed to get the most benefit. Patient denies forgetting or missing doses and tells me she uses a pillbox to organize her medications. Patient denies any questions or concerns with medications at this time.

## 2022-04-14 ENCOUNTER — TELEPHONE (OUTPATIENT)
Dept: INTERNAL MEDICINE CLINIC | Facility: CLINIC | Age: 77
End: 2022-04-14

## 2022-08-05 ENCOUNTER — OFFICE VISIT (OUTPATIENT)
Dept: INTERNAL MEDICINE CLINIC | Facility: CLINIC | Age: 77
End: 2022-08-05
Payer: MEDICARE

## 2022-08-05 VITALS
DIASTOLIC BLOOD PRESSURE: 88 MMHG | HEART RATE: 72 BPM | WEIGHT: 134.38 LBS | TEMPERATURE: 99 F | BODY MASS INDEX: 22.39 KG/M2 | RESPIRATION RATE: 16 BRPM | SYSTOLIC BLOOD PRESSURE: 138 MMHG | HEIGHT: 65 IN | OXYGEN SATURATION: 98 %

## 2022-08-05 DIAGNOSIS — I10 ESSENTIAL HYPERTENSION: ICD-10-CM

## 2022-08-05 DIAGNOSIS — E11.69 MIXED HYPERLIPIDEMIA DUE TO TYPE 2 DIABETES MELLITUS (HCC): ICD-10-CM

## 2022-08-05 DIAGNOSIS — N39.0 RECURRENT UTI: ICD-10-CM

## 2022-08-05 DIAGNOSIS — R92.8 ABNORMAL MAMMOGRAM: ICD-10-CM

## 2022-08-05 DIAGNOSIS — R71.8 ABNORMAL RBC INDICES: ICD-10-CM

## 2022-08-05 DIAGNOSIS — Z63.6 CAREGIVER STRESS: ICD-10-CM

## 2022-08-05 DIAGNOSIS — E78.2 MIXED HYPERLIPIDEMIA DUE TO TYPE 2 DIABETES MELLITUS (HCC): ICD-10-CM

## 2022-08-05 DIAGNOSIS — E03.9 HYPOTHYROIDISM, UNSPECIFIED TYPE: ICD-10-CM

## 2022-08-05 DIAGNOSIS — E11.9 TYPE 2 DIABETES MELLITUS WITHOUT COMPLICATION, WITHOUT LONG-TERM CURRENT USE OF INSULIN (HCC): ICD-10-CM

## 2022-08-05 DIAGNOSIS — Z00.00 WELLNESS EXAMINATION: Primary | ICD-10-CM

## 2022-08-05 LAB
CARTRIDGE LOT#: 981 NUMERIC
HEMOGLOBIN A1C: 7.2 % (ref 4.3–5.6)

## 2022-08-05 PROCEDURE — 99397 PER PM REEVAL EST PAT 65+ YR: CPT | Performed by: INTERNAL MEDICINE

## 2022-08-05 PROCEDURE — G0439 PPPS, SUBSEQ VISIT: HCPCS | Performed by: INTERNAL MEDICINE

## 2022-08-05 PROCEDURE — 3008F BODY MASS INDEX DOCD: CPT | Performed by: INTERNAL MEDICINE

## 2022-08-05 PROCEDURE — 96160 PT-FOCUSED HLTH RISK ASSMT: CPT | Performed by: INTERNAL MEDICINE

## 2022-08-05 PROCEDURE — 3079F DIAST BP 80-89 MM HG: CPT | Performed by: INTERNAL MEDICINE

## 2022-08-05 PROCEDURE — 1126F AMNT PAIN NOTED NONE PRSNT: CPT | Performed by: INTERNAL MEDICINE

## 2022-08-05 PROCEDURE — 83036 HEMOGLOBIN GLYCOSYLATED A1C: CPT | Performed by: INTERNAL MEDICINE

## 2022-08-05 PROCEDURE — 3075F SYST BP GE 130 - 139MM HG: CPT | Performed by: INTERNAL MEDICINE

## 2022-08-05 SDOH — SOCIAL STABILITY - SOCIAL INSECURITY: DEPENDENT RELATIVE NEEDING CARE AT HOME: Z63.6

## 2022-08-15 ENCOUNTER — OFFICE VISIT (OUTPATIENT)
Dept: INTERNAL MEDICINE CLINIC | Facility: CLINIC | Age: 77
End: 2022-08-15
Payer: MEDICARE

## 2022-08-15 VITALS
WEIGHT: 135.38 LBS | HEART RATE: 78 BPM | DIASTOLIC BLOOD PRESSURE: 80 MMHG | SYSTOLIC BLOOD PRESSURE: 128 MMHG | BODY MASS INDEX: 22.56 KG/M2 | TEMPERATURE: 98 F | RESPIRATION RATE: 16 BRPM | OXYGEN SATURATION: 98 % | HEIGHT: 65 IN

## 2022-08-15 DIAGNOSIS — R35.0 URINARY FREQUENCY: Primary | ICD-10-CM

## 2022-08-15 LAB
APPEARANCE: CLEAR
BILIRUBIN: NEGATIVE
GLUCOSE (URINE DIPSTICK): NEGATIVE MG/DL
KETONES (URINE DIPSTICK): NEGATIVE MG/DL
LEUKOCYTES: NEGATIVE
MULTISTIX LOT#: NORMAL NUMERIC
NITRITE, URINE: NEGATIVE
OCCULT BLOOD: NEGATIVE
PH, URINE: 5.5 (ref 4.5–8)
PROTEIN (URINE DIPSTICK): NEGATIVE MG/DL
SPECIFIC GRAVITY: 1.01 (ref 1–1.03)
URINE-COLOR: YELLOW
UROBILINOGEN,SEMI-QN: 0.2 MG/DL (ref 0–1.9)

## 2022-08-15 PROCEDURE — 3008F BODY MASS INDEX DOCD: CPT | Performed by: INTERNAL MEDICINE

## 2022-08-15 PROCEDURE — 87086 URINE CULTURE/COLONY COUNT: CPT | Performed by: INTERNAL MEDICINE

## 2022-08-15 PROCEDURE — 99213 OFFICE O/P EST LOW 20 MIN: CPT | Performed by: INTERNAL MEDICINE

## 2022-08-15 PROCEDURE — 3079F DIAST BP 80-89 MM HG: CPT | Performed by: INTERNAL MEDICINE

## 2022-08-15 PROCEDURE — 3074F SYST BP LT 130 MM HG: CPT | Performed by: INTERNAL MEDICINE

## 2022-08-16 ENCOUNTER — APPOINTMENT (OUTPATIENT)
Dept: ULTRASOUND IMAGING | Age: 77
End: 2022-08-16
Attending: EMERGENCY MEDICINE
Payer: MEDICARE

## 2022-08-16 ENCOUNTER — HOSPITAL ENCOUNTER (EMERGENCY)
Age: 77
Discharge: HOME | End: 2022-08-16
Attending: EMERGENCY MEDICINE
Payer: MEDICARE

## 2022-08-16 ENCOUNTER — APPOINTMENT (OUTPATIENT)
Dept: GENERAL RADIOLOGY | Age: 77
End: 2022-08-16
Attending: EMERGENCY MEDICINE
Payer: MEDICARE

## 2022-08-16 VITALS
HEART RATE: 70 BPM | SYSTOLIC BLOOD PRESSURE: 142 MMHG | DIASTOLIC BLOOD PRESSURE: 67 MMHG | OXYGEN SATURATION: 94 % | WEIGHT: 135 LBS | BODY MASS INDEX: 22.49 KG/M2 | TEMPERATURE: 98 F | RESPIRATION RATE: 18 BRPM | HEIGHT: 65 IN

## 2022-08-16 DIAGNOSIS — M79.605 PAIN OF LEFT LOWER EXTREMITY: ICD-10-CM

## 2022-08-16 DIAGNOSIS — M71.22 BAKER'S CYST OF KNEE, LEFT: ICD-10-CM

## 2022-08-16 DIAGNOSIS — M25.562 ACUTE PAIN OF LEFT KNEE: Primary | ICD-10-CM

## 2022-08-16 LAB
BILIRUB UR QL STRIP.AUTO: NEGATIVE
CLARITY UR REFRACT.AUTO: CLEAR
COLOR UR AUTO: YELLOW
GLUCOSE UR STRIP.AUTO-MCNC: NEGATIVE MG/DL
KETONES UR STRIP.AUTO-MCNC: NEGATIVE MG/DL
NITRITE UR QL STRIP.AUTO: NEGATIVE
PH UR STRIP.AUTO: 5 [PH] (ref 5–8)
PROT UR STRIP.AUTO-MCNC: NEGATIVE MG/DL
RBC UR QL AUTO: NEGATIVE
SP GR UR STRIP.AUTO: 1.01 (ref 1–1.03)
UROBILINOGEN UR STRIP.AUTO-MCNC: 0.2 MG/DL

## 2022-08-16 PROCEDURE — 73552 X-RAY EXAM OF FEMUR 2/>: CPT | Performed by: EMERGENCY MEDICINE

## 2022-08-16 PROCEDURE — 73590 X-RAY EXAM OF LOWER LEG: CPT | Performed by: EMERGENCY MEDICINE

## 2022-08-16 PROCEDURE — 81001 URINALYSIS AUTO W/SCOPE: CPT | Performed by: EMERGENCY MEDICINE

## 2022-08-16 PROCEDURE — 99284 EMERGENCY DEPT VISIT MOD MDM: CPT | Performed by: EMERGENCY MEDICINE

## 2022-08-16 PROCEDURE — 93971 EXTREMITY STUDY: CPT | Performed by: EMERGENCY MEDICINE

## 2022-08-16 PROCEDURE — 81015 MICROSCOPIC EXAM OF URINE: CPT | Performed by: EMERGENCY MEDICINE

## 2022-08-17 ENCOUNTER — TELEPHONE (OUTPATIENT)
Dept: INTERNAL MEDICINE CLINIC | Facility: CLINIC | Age: 77
End: 2022-08-17

## 2022-08-17 DIAGNOSIS — M79.605 LEFT LEG PAIN: Primary | ICD-10-CM

## 2022-08-17 DIAGNOSIS — M71.22 BAKER'S CYST OF KNEE, LEFT: ICD-10-CM

## 2022-08-17 NOTE — TELEPHONE ENCOUNTER
Spoke with patient via phone. Made aware referral for Dr. Lyly Vaz has been placed and pending approval.  Explained office can call once referral has been approved and that patient can also check back with our office in 3-4 days if she would like to check sooner. Patient verbalized understanding. Denies further questions.

## 2022-08-17 NOTE — TELEPHONE ENCOUNTER
Pt stated she has appointment with Dr Marce Lopes on 9/1. Pt stated she is on a waiting list to be seen earlier with Dr Marce Lopes.      Future Appointments   Date Time Provider Marco A Olsen   9/1/2022  1:20 PM Rosana Crockett MD [facility] [place] [place] [id number]   2/6/2023  9:00 AM Meg Kelly MD [facility] 8 [facility]        Pt just wanted to let Dr Emilee jensen - Sunny Tian

## 2022-08-17 NOTE — TELEPHONE ENCOUNTER
LS- Patient requesting referral to ortho, order pended for your review    [facility] for [facility] pain. US Venous Doppler left leg negative for DVT, but positive for Baker's cyst.  Was recommended she follow-up with ortho (Dr. Salvatore Iqbal).

## 2022-09-01 ENCOUNTER — HOSPITAL ENCOUNTER (OUTPATIENT)
Dept: GENERAL RADIOLOGY | Age: 77
Discharge: HOME | End: 2022-09-01
Attending: ORTHOPAEDIC SURGERY
Payer: MEDICARE

## 2022-09-01 ENCOUNTER — OFFICE VISIT (OUTPATIENT)
Dept: ORTHOPEDICS CLINIC | Facility: CLINIC | Age: 77
End: 2022-09-01
Payer: MEDICARE

## 2022-09-01 ENCOUNTER — TELEPHONE (OUTPATIENT)
Dept: ORTHOPEDICS CLINIC | Facility: CLINIC | Age: 77
End: 2022-09-01

## 2022-09-01 VITALS — BODY MASS INDEX: 22.49 KG/M2 | HEIGHT: 65 IN | WEIGHT: 135 LBS

## 2022-09-01 DIAGNOSIS — M25.562 LEFT KNEE PAIN, UNSPECIFIED CHRONICITY: Primary | ICD-10-CM

## 2022-09-01 DIAGNOSIS — M25.562 LEFT KNEE PAIN, UNSPECIFIED CHRONICITY: ICD-10-CM

## 2022-09-01 DIAGNOSIS — M17.12 PRIMARY OSTEOARTHRITIS OF LEFT KNEE: Primary | ICD-10-CM

## 2022-09-01 DIAGNOSIS — M25.462 EFFUSION OF LEFT KNEE: ICD-10-CM

## 2022-09-01 PROCEDURE — 73564 X-RAY EXAM KNEE 4 OR MORE: CPT | Performed by: ORTHOPAEDIC SURGERY

## 2022-09-01 PROCEDURE — 3008F BODY MASS INDEX DOCD: CPT | Performed by: ORTHOPAEDIC SURGERY

## 2022-09-01 PROCEDURE — 20610 DRAIN/INJ JOINT/BURSA W/O US: CPT | Performed by: ORTHOPAEDIC SURGERY

## 2022-09-01 PROCEDURE — 99204 OFFICE O/P NEW MOD 45 MIN: CPT | Performed by: ORTHOPAEDIC SURGERY

## 2022-09-01 RX ORDER — TRIAMCINOLONE ACETONIDE 40 MG/ML
40 INJECTION, SUSPENSION INTRA-ARTICULAR; INTRAMUSCULAR ONCE
Status: COMPLETED | OUTPATIENT
Start: 2022-09-01 | End: 2022-09-01

## 2022-09-01 RX ADMIN — TRIAMCINOLONE ACETONIDE 40 MG: 40 INJECTION, SUSPENSION INTRA-ARTICULAR; INTRAMUSCULAR at 16:24:00

## 2022-09-20 ENCOUNTER — LAB ENCOUNTER (OUTPATIENT)
Dept: LAB | Age: 77
End: 2022-09-20
Attending: INTERNAL MEDICINE

## 2022-09-20 DIAGNOSIS — E78.2 MIXED HYPERLIPIDEMIA DUE TO TYPE 2 DIABETES MELLITUS (HCC): ICD-10-CM

## 2022-09-20 DIAGNOSIS — E11.9 TYPE 2 DIABETES MELLITUS WITHOUT COMPLICATION, WITHOUT LONG-TERM CURRENT USE OF INSULIN (HCC): ICD-10-CM

## 2022-09-20 DIAGNOSIS — R71.8 ABNORMAL RBC INDICES: ICD-10-CM

## 2022-09-20 DIAGNOSIS — E03.9 HYPOTHYROIDISM, UNSPECIFIED TYPE: ICD-10-CM

## 2022-09-20 DIAGNOSIS — E11.69 MIXED HYPERLIPIDEMIA DUE TO TYPE 2 DIABETES MELLITUS (HCC): ICD-10-CM

## 2022-09-20 LAB
ALBUMIN SERPL-MCNC: 3.9 G/DL (ref 3.4–5)
ALBUMIN/GLOB SERPL: 1.5 {RATIO} (ref 1–2)
ALP LIVER SERPL-CCNC: 42 U/L
ALT SERPL-CCNC: 40 U/L
ANION GAP SERPL CALC-SCNC: 4 MMOL/L (ref 0–18)
AST SERPL-CCNC: 27 U/L (ref 15–37)
BASOPHILS # BLD AUTO: 0.02 X10(3) UL (ref 0–0.2)
BASOPHILS NFR BLD AUTO: 0.3 %
BILIRUB SERPL-MCNC: 0.6 MG/DL (ref 0.1–2)
BUN BLD-MCNC: 15 MG/DL (ref 7–18)
CALCIUM BLD-MCNC: 9 MG/DL (ref 8.5–10.1)
CHLORIDE SERPL-SCNC: 106 MMOL/L (ref 98–112)
CHOLEST SERPL-MCNC: 114 MG/DL (ref ?–200)
CO2 SERPL-SCNC: 28 MMOL/L (ref 21–32)
CREAT BLD-MCNC: 0.81 MG/DL
CREAT UR-SCNC: 229 MG/DL
EOSINOPHIL # BLD AUTO: 0.15 X10(3) UL (ref 0–0.7)
EOSINOPHIL NFR BLD AUTO: 2.5 %
ERYTHROCYTE [DISTWIDTH] IN BLOOD BY AUTOMATED COUNT: 14.4 %
FASTING PATIENT LIPID ANSWER: YES
FASTING STATUS PATIENT QL REPORTED: YES
GFR SERPLBLD BASED ON 1.73 SQ M-ARVRAT: 75 ML/MIN/1.73M2 (ref 60–?)
GLOBULIN PLAS-MCNC: 2.6 G/DL (ref 2.8–4.4)
GLUCOSE BLD-MCNC: 99 MG/DL (ref 70–99)
HCT VFR BLD AUTO: 44.7 %
HDLC SERPL-MCNC: 70 MG/DL (ref 40–59)
HGB BLD-MCNC: 14.1 G/DL
IMM GRANULOCYTES # BLD AUTO: 0.02 X10(3) UL (ref 0–1)
IMM GRANULOCYTES NFR BLD: 0.3 %
LDLC SERPL CALC-MCNC: 30 MG/DL (ref ?–100)
LYMPHOCYTES # BLD AUTO: 1.32 X10(3) UL (ref 1–4)
LYMPHOCYTES NFR BLD AUTO: 22.1 %
MCH RBC QN AUTO: 33.5 PG (ref 26–34)
MCHC RBC AUTO-ENTMCNC: 31.5 G/DL (ref 31–37)
MCV RBC AUTO: 106.2 FL
MICROALBUMIN UR-MCNC: 3.01 MG/DL
MICROALBUMIN/CREAT 24H UR-RTO: 13.1 UG/MG (ref ?–30)
MONOCYTES # BLD AUTO: 0.51 X10(3) UL (ref 0.1–1)
MONOCYTES NFR BLD AUTO: 8.5 %
NEUTROPHILS # BLD AUTO: 3.96 X10 (3) UL (ref 1.5–7.7)
NEUTROPHILS # BLD AUTO: 3.96 X10(3) UL (ref 1.5–7.7)
NEUTROPHILS NFR BLD AUTO: 66.3 %
NONHDLC SERPL-MCNC: 44 MG/DL (ref ?–130)
OSMOLALITY SERPL CALC.SUM OF ELEC: 287 MOSM/KG (ref 275–295)
PLATELET # BLD AUTO: 225 10(3)UL (ref 150–450)
POTASSIUM SERPL-SCNC: 4.4 MMOL/L (ref 3.5–5.1)
PROT SERPL-MCNC: 6.5 G/DL (ref 6.4–8.2)
RBC # BLD AUTO: 4.21 X10(6)UL
SODIUM SERPL-SCNC: 138 MMOL/L (ref 136–145)
TRIGL SERPL-MCNC: 66 MG/DL (ref 30–149)
TSI SER-ACNC: 1.65 MIU/ML (ref 0.36–3.74)
VLDLC SERPL CALC-MCNC: 9 MG/DL (ref 0–30)
WBC # BLD AUTO: 6 X10(3) UL (ref 4–11)

## 2022-09-20 PROCEDURE — 82570 ASSAY OF URINE CREATININE: CPT

## 2022-09-20 PROCEDURE — 80061 LIPID PANEL: CPT

## 2022-09-20 PROCEDURE — 85025 COMPLETE CBC W/AUTO DIFF WBC: CPT

## 2022-09-20 PROCEDURE — 82043 UR ALBUMIN QUANTITATIVE: CPT

## 2022-09-20 PROCEDURE — 84443 ASSAY THYROID STIM HORMONE: CPT

## 2022-09-20 PROCEDURE — 80053 COMPREHEN METABOLIC PANEL: CPT

## 2022-09-20 PROCEDURE — 36415 COLL VENOUS BLD VENIPUNCTURE: CPT

## 2022-09-23 ENCOUNTER — OFFICE VISIT (OUTPATIENT)
Dept: INTERNAL MEDICINE CLINIC | Facility: CLINIC | Age: 77
End: 2022-09-23

## 2022-09-23 VITALS
RESPIRATION RATE: 16 BRPM | DIASTOLIC BLOOD PRESSURE: 70 MMHG | WEIGHT: 132.81 LBS | BODY MASS INDEX: 22.13 KG/M2 | TEMPERATURE: 98 F | HEIGHT: 65 IN | HEART RATE: 86 BPM | SYSTOLIC BLOOD PRESSURE: 138 MMHG | OXYGEN SATURATION: 97 %

## 2022-09-23 DIAGNOSIS — E11.9 TYPE 2 DIABETES MELLITUS WITHOUT COMPLICATION, WITHOUT LONG-TERM CURRENT USE OF INSULIN (HCC): ICD-10-CM

## 2022-09-23 DIAGNOSIS — I10 ESSENTIAL HYPERTENSION: Primary | ICD-10-CM

## 2022-09-23 DIAGNOSIS — M17.12 PRIMARY OSTEOARTHRITIS OF LEFT KNEE: ICD-10-CM

## 2022-09-23 PROCEDURE — 3075F SYST BP GE 130 - 139MM HG: CPT | Performed by: INTERNAL MEDICINE

## 2022-09-23 PROCEDURE — 3008F BODY MASS INDEX DOCD: CPT | Performed by: INTERNAL MEDICINE

## 2022-09-23 PROCEDURE — 3078F DIAST BP <80 MM HG: CPT | Performed by: INTERNAL MEDICINE

## 2022-09-23 PROCEDURE — 99214 OFFICE O/P EST MOD 30 MIN: CPT | Performed by: INTERNAL MEDICINE

## 2022-09-23 RX ORDER — QUINAPRIL 40 MG/1
40 TABLET ORAL DAILY
Qty: 90 TABLET | Refills: 3 | Status: SHIPPED | OUTPATIENT
Start: 2022-09-23

## 2022-09-23 RX ORDER — METOPROLOL TARTRATE 50 MG/1
50 TABLET, FILM COATED ORAL 2 TIMES DAILY
Qty: 180 TABLET | Refills: 3 | Status: SHIPPED | OUTPATIENT
Start: 2022-09-23

## 2022-09-23 NOTE — PATIENT INSTRUCTIONS
Call to schedule appointment with your eye doctor    Follow up with Dr. Anna Sosa if knee symptoms worsen or do not continue to improve  [phone]    Covid bivalent booster recommended

## 2022-10-19 RX ORDER — AMLODIPINE BESYLATE 5 MG/1
TABLET ORAL
Qty: 90 TABLET | Refills: 0 | OUTPATIENT
Start: 2022-10-19

## 2022-11-04 ENCOUNTER — HOSPITAL ENCOUNTER (OUTPATIENT)
Dept: MAMMOGRAPHY | Age: 77
Discharge: HOME | End: 2022-11-04
Attending: INTERNAL MEDICINE
Payer: MEDICARE

## 2022-11-04 DIAGNOSIS — R92.8 ABNORMAL MAMMOGRAM: ICD-10-CM

## 2022-11-04 PROCEDURE — 77066 DX MAMMO INCL CAD BI: CPT | Performed by: INTERNAL MEDICINE

## 2022-11-04 PROCEDURE — 77062 BREAST TOMOSYNTHESIS BI: CPT | Performed by: INTERNAL MEDICINE

## 2022-12-14 RX ORDER — AMLODIPINE BESYLATE 5 MG/1
TABLET ORAL
Qty: 90 TABLET | Refills: 0 | Status: SHIPPED | OUTPATIENT
Start: 2022-12-14

## 2022-12-14 NOTE — TELEPHONE ENCOUNTER
Protocol passed     Requesting: amlodipine 5mg     LOV: 9/23/22   RTC: none noted   Filled: 12/16/21 # 90 3 refills   Recent Labs: 9/20/22     Upcoming OV: 2/6/23

## 2022-12-27 ENCOUNTER — TELEPHONE (OUTPATIENT)
Dept: INTERNAL MEDICINE CLINIC | Facility: CLINIC | Age: 77
End: 2022-12-27

## 2022-12-27 NOTE — TELEPHONE ENCOUNTER
Quin Farmer from [facility] called stating Quinapril is on back order, requesting alternative sent.      ph: [phone]

## 2022-12-28 RX ORDER — LISINOPRIL 40 MG/1
40 TABLET ORAL DAILY
Qty: 90 TABLET | Refills: 0 | Status: SHIPPED | OUTPATIENT
Start: 2022-12-28

## 2022-12-28 NOTE — TELEPHONE ENCOUNTER
Let patient know about the quinapril back order and that she needs to monitor her blood pressure on the substitute medication called lisinopril.  If persists below 110 for the top number, let me know

## 2023-01-03 NOTE — TELEPHONE ENCOUNTER
LS - completed, [facility]    [phone] spoke with pt, she has not yet picked up the lisinopril. Pt agreed to monitor her BP and call the office if her top number is dropping below 110 regularly. Pt will also ask at the pharmacy when the Quinapril is expected to be back in stock.

## 2023-01-10 DIAGNOSIS — E03.8 HYPOTHYROIDISM DUE TO HASHIMOTO'S THYROIDITIS: ICD-10-CM

## 2023-01-10 DIAGNOSIS — E06.3 HYPOTHYROIDISM DUE TO HASHIMOTO'S THYROIDITIS: ICD-10-CM

## 2023-01-10 RX ORDER — LEVOTHYROXINE SODIUM 0.05 MG/1
50 TABLET ORAL DAILY
Qty: 90 TABLET | Refills: 3 | Status: SHIPPED | OUTPATIENT
Start: 2023-01-10

## 2023-01-10 NOTE — TELEPHONE ENCOUNTER
Pt called and stated that she received a refill for Quinapril through Mail Order so she doesn't need to  Lisinopril.      TERENCE

## 2023-02-06 ENCOUNTER — OFFICE VISIT (OUTPATIENT)
Dept: INTERNAL MEDICINE CLINIC | Facility: CLINIC | Age: 78
End: 2023-02-06
Payer: MEDICARE

## 2023-02-06 VITALS
OXYGEN SATURATION: 98 % | HEIGHT: 65 IN | SYSTOLIC BLOOD PRESSURE: 130 MMHG | BODY MASS INDEX: 22.69 KG/M2 | HEART RATE: 67 BPM | DIASTOLIC BLOOD PRESSURE: 64 MMHG | WEIGHT: 136.19 LBS | TEMPERATURE: 98 F | RESPIRATION RATE: 16 BRPM

## 2023-02-06 DIAGNOSIS — E11.69 MIXED HYPERLIPIDEMIA DUE TO TYPE 2 DIABETES MELLITUS (HCC): ICD-10-CM

## 2023-02-06 DIAGNOSIS — E11.9 TYPE 2 DIABETES MELLITUS WITHOUT COMPLICATION, WITHOUT LONG-TERM CURRENT USE OF INSULIN (HCC): ICD-10-CM

## 2023-02-06 DIAGNOSIS — Z23 NEED FOR INFLUENZA VACCINATION: ICD-10-CM

## 2023-02-06 DIAGNOSIS — E03.8 HYPOTHYROIDISM DUE TO HASHIMOTO'S THYROIDITIS: ICD-10-CM

## 2023-02-06 DIAGNOSIS — E06.3 HYPOTHYROIDISM DUE TO HASHIMOTO'S THYROIDITIS: ICD-10-CM

## 2023-02-06 DIAGNOSIS — N39.0 RECURRENT UTI: ICD-10-CM

## 2023-02-06 DIAGNOSIS — I10 ESSENTIAL HYPERTENSION: ICD-10-CM

## 2023-02-06 DIAGNOSIS — R71.8 ABNORMAL RBC INDICES: ICD-10-CM

## 2023-02-06 DIAGNOSIS — Z63.6 CAREGIVER STRESS: ICD-10-CM

## 2023-02-06 DIAGNOSIS — Z00.00 WELLNESS EXAMINATION: Primary | ICD-10-CM

## 2023-02-06 DIAGNOSIS — E78.2 MIXED HYPERLIPIDEMIA DUE TO TYPE 2 DIABETES MELLITUS (HCC): ICD-10-CM

## 2023-02-06 PROBLEM — R92.8 ABNORMAL MAMMOGRAM: Status: RESOLVED | Noted: 2022-08-05 | Resolved: 2023-02-06

## 2023-02-06 LAB
CARTRIDGE LOT#: 30 NUMERIC
HEMOGLOBIN A1C: 6.5 % (ref 4.3–5.6)

## 2023-02-06 PROCEDURE — 3078F DIAST BP <80 MM HG: CPT | Performed by: INTERNAL MEDICINE

## 2023-02-06 PROCEDURE — 96160 PT-FOCUSED HLTH RISK ASSMT: CPT | Performed by: INTERNAL MEDICINE

## 2023-02-06 PROCEDURE — 99397 PER PM REEVAL EST PAT 65+ YR: CPT | Performed by: INTERNAL MEDICINE

## 2023-02-06 PROCEDURE — 1125F AMNT PAIN NOTED PAIN PRSNT: CPT | Performed by: INTERNAL MEDICINE

## 2023-02-06 PROCEDURE — 83036 HEMOGLOBIN GLYCOSYLATED A1C: CPT | Performed by: INTERNAL MEDICINE

## 2023-02-06 PROCEDURE — G0439 PPPS, SUBSEQ VISIT: HCPCS | Performed by: INTERNAL MEDICINE

## 2023-02-06 PROCEDURE — 3075F SYST BP GE 130 - 139MM HG: CPT | Performed by: INTERNAL MEDICINE

## 2023-02-06 PROCEDURE — 3008F BODY MASS INDEX DOCD: CPT | Performed by: INTERNAL MEDICINE

## 2023-02-06 PROCEDURE — G0008 ADMIN INFLUENZA VIRUS VAC: HCPCS | Performed by: INTERNAL MEDICINE

## 2023-02-06 PROCEDURE — 90662 IIV NO PRSV INCREASED AG IM: CPT | Performed by: INTERNAL MEDICINE

## 2023-02-06 RX ORDER — LISINOPRIL 40 MG/1
40 TABLET ORAL DAILY
Qty: 90 TABLET | Refills: 3 | Status: SHIPPED | OUTPATIENT
Start: 2023-02-06

## 2023-02-06 SDOH — SOCIAL STABILITY - SOCIAL INSECURITY: DEPENDENT RELATIVE NEEDING CARE AT HOME: Z63.6

## 2023-03-31 RX ORDER — AMLODIPINE BESYLATE 5 MG/1
TABLET ORAL
Qty: 90 TABLET | Refills: 0 | Status: SHIPPED | OUTPATIENT
Start: 2023-03-31

## 2023-04-28 ENCOUNTER — HOSPITAL ENCOUNTER (OUTPATIENT)
Age: 78
Discharge: HOME | End: 2023-04-28
Payer: MEDICARE

## 2023-04-28 ENCOUNTER — TELEPHONE (OUTPATIENT)
Dept: INTERNAL MEDICINE CLINIC | Facility: CLINIC | Age: 78
End: 2023-04-28

## 2023-04-28 VITALS
DIASTOLIC BLOOD PRESSURE: 86 MMHG | WEIGHT: 135 LBS | OXYGEN SATURATION: 95 % | SYSTOLIC BLOOD PRESSURE: 162 MMHG | HEIGHT: 65 IN | HEART RATE: 73 BPM | BODY MASS INDEX: 22.49 KG/M2 | RESPIRATION RATE: 16 BRPM | TEMPERATURE: 98 F

## 2023-04-28 DIAGNOSIS — B02.9 HERPES ZOSTER WITHOUT COMPLICATION: Primary | ICD-10-CM

## 2023-04-28 PROCEDURE — 99213 OFFICE O/P EST LOW 20 MIN: CPT

## 2023-04-28 PROCEDURE — 99214 OFFICE O/P EST MOD 30 MIN: CPT

## 2023-04-28 RX ORDER — PREDNISONE 20 MG/1
20 TABLET ORAL DAILY
Qty: 5 TABLET | Refills: 0 | Status: SHIPPED | OUTPATIENT
Start: 2023-04-28 | End: 2023-05-03

## 2023-04-28 RX ORDER — VALACYCLOVIR HYDROCHLORIDE 1 G/1
1000 TABLET, FILM COATED ORAL EVERY 12 HOURS
Qty: 20 TABLET | Refills: 0 | Status: SHIPPED | OUTPATIENT
Start: 2023-04-28 | End: 2023-05-08

## 2023-04-28 NOTE — TELEPHONE ENCOUNTER
Pt stopped quinapril a \"few weeks ago\" and started lisinopril the next day. A few days ago she felt irritation on her the R scapula and R side of neck. Her  looked at the area and informed her that there is a rash where she is feeling the irritation. . There is still irritation, pain and itching to that area. She does believe there is clear fluid in the center of the rash but can not be certain since they are on her back. Denies fever, chills, nausea, vomiting, burning, tingling, difficulty swallowing, swelling to face/lips/tongue mouth, difficulty swallowing or rash spreading. Dr Bobby Corbin can you accommodate an appointment?

## 2023-04-28 NOTE — TELEPHONE ENCOUNTER
Pt called stating she has switched from  Quinapril to Lisnopril, per LS, and she thinks she is having an allergic reaction. Pt c/o hives on her shoulders, neck, and chest. Pt also states it gives her a stomach ache.     Pt would like to know if she should stop taking it and if she should take benadryl

## 2023-04-28 NOTE — TELEPHONE ENCOUNTER
Future Appointments   Date Time Provider Marco A Olsen   4/28/2023  4:00 PM Judith Dance, MD [facility] [place]   6/7/2023  8:30 AM Judith Dance, MD [facility] [place]

## 2023-06-07 ENCOUNTER — OFFICE VISIT (OUTPATIENT)
Dept: INTERNAL MEDICINE CLINIC | Facility: CLINIC | Age: 78
End: 2023-06-07
Payer: MEDICARE

## 2023-06-07 VITALS
HEART RATE: 75 BPM | OXYGEN SATURATION: 99 % | HEIGHT: 65 IN | RESPIRATION RATE: 16 BRPM | DIASTOLIC BLOOD PRESSURE: 80 MMHG | WEIGHT: 133 LBS | TEMPERATURE: 98 F | SYSTOLIC BLOOD PRESSURE: 138 MMHG | BODY MASS INDEX: 22.16 KG/M2

## 2023-06-07 DIAGNOSIS — H91.91 HEARING LOSS OF RIGHT EAR, UNSPECIFIED HEARING LOSS TYPE: ICD-10-CM

## 2023-06-07 DIAGNOSIS — Z63.6 CAREGIVER STRESS: ICD-10-CM

## 2023-06-07 DIAGNOSIS — E06.3 HYPOTHYROIDISM DUE TO HASHIMOTO'S THYROIDITIS: ICD-10-CM

## 2023-06-07 DIAGNOSIS — E11.9 TYPE 2 DIABETES MELLITUS WITHOUT COMPLICATION, WITHOUT LONG-TERM CURRENT USE OF INSULIN (HCC): ICD-10-CM

## 2023-06-07 DIAGNOSIS — H61.21 IMPACTED CERUMEN OF RIGHT EAR: ICD-10-CM

## 2023-06-07 DIAGNOSIS — I10 ESSENTIAL HYPERTENSION: Primary | ICD-10-CM

## 2023-06-07 DIAGNOSIS — E78.2 MIXED HYPERLIPIDEMIA DUE TO TYPE 2 DIABETES MELLITUS (HCC): ICD-10-CM

## 2023-06-07 DIAGNOSIS — E11.69 MIXED HYPERLIPIDEMIA DUE TO TYPE 2 DIABETES MELLITUS (HCC): ICD-10-CM

## 2023-06-07 DIAGNOSIS — E03.8 HYPOTHYROIDISM DUE TO HASHIMOTO'S THYROIDITIS: ICD-10-CM

## 2023-06-07 PROCEDURE — 69209 REMOVE IMPACTED EAR WAX UNI: CPT | Performed by: INTERNAL MEDICINE

## 2023-06-07 PROCEDURE — 3079F DIAST BP 80-89 MM HG: CPT | Performed by: INTERNAL MEDICINE

## 2023-06-07 PROCEDURE — 1159F MED LIST DOCD IN RCRD: CPT | Performed by: INTERNAL MEDICINE

## 2023-06-07 PROCEDURE — 99214 OFFICE O/P EST MOD 30 MIN: CPT | Performed by: INTERNAL MEDICINE

## 2023-06-07 PROCEDURE — 1160F RVW MEDS BY RX/DR IN RCRD: CPT | Performed by: INTERNAL MEDICINE

## 2023-06-07 PROCEDURE — 3075F SYST BP GE 130 - 139MM HG: CPT | Performed by: INTERNAL MEDICINE

## 2023-06-07 PROCEDURE — 3008F BODY MASS INDEX DOCD: CPT | Performed by: INTERNAL MEDICINE

## 2023-06-07 RX ORDER — AMLODIPINE BESYLATE 5 MG/1
5 TABLET ORAL DAILY
Qty: 90 TABLET | Refills: 3 | Status: SHIPPED | OUTPATIENT
Start: 2023-06-07

## 2023-06-07 SDOH — SOCIAL STABILITY - SOCIAL INSECURITY: DEPENDENT RELATIVE NEEDING CARE AT HOME: Z63.6

## 2023-06-08 ENCOUNTER — TELEPHONE (OUTPATIENT)
Facility: LOCATION | Age: 78
End: 2023-06-08

## 2023-06-14 ENCOUNTER — OFFICE VISIT (OUTPATIENT)
Facility: LOCATION | Age: 78
End: 2023-06-14
Payer: MEDICARE

## 2023-06-14 DIAGNOSIS — H92.01 ACUTE OTALGIA, RIGHT: Primary | ICD-10-CM

## 2023-06-14 DIAGNOSIS — H93.293 ABNORMAL AUDITORY PERCEPTION OF BOTH EARS: Primary | ICD-10-CM

## 2023-06-14 DIAGNOSIS — H90.3 SENSORINEURAL HEARING LOSS (SNHL) OF BOTH EARS: ICD-10-CM

## 2023-06-14 PROCEDURE — 92557 COMPREHENSIVE HEARING TEST: CPT | Performed by: AUDIOLOGIST

## 2023-06-14 PROCEDURE — 1160F RVW MEDS BY RX/DR IN RCRD: CPT | Performed by: OTOLARYNGOLOGY

## 2023-06-14 PROCEDURE — 92567 TYMPANOMETRY: CPT | Performed by: AUDIOLOGIST

## 2023-06-14 PROCEDURE — 1159F MED LIST DOCD IN RCRD: CPT | Performed by: OTOLARYNGOLOGY

## 2023-06-14 PROCEDURE — 99203 OFFICE O/P NEW LOW 30 MIN: CPT | Performed by: OTOLARYNGOLOGY

## 2023-06-14 NOTE — PROGRESS NOTES
Abran Srivastava was seen for audiometric evaluation today. Referred back to physician.      Sami Freeman

## 2023-11-15 RX ORDER — METOPROLOL TARTRATE 50 MG/1
50 TABLET, FILM COATED ORAL 2 TIMES DAILY
Qty: 180 TABLET | Refills: 3 | Status: SHIPPED | OUTPATIENT
Start: 2023-11-15

## 2023-11-15 NOTE — TELEPHONE ENCOUNTER
Protocol failed     Metoprolol tartrate 50mg     LOV: 6/7/23   RTC: 6 months  Filled: 9/23/22 # 180 3 refills   Labs: 9/20/22   No future appointments.

## 2023-11-26 ENCOUNTER — OFFICE VISIT (OUTPATIENT)
Dept: FAMILY MEDICINE CLINIC | Facility: CLINIC | Age: 78
End: 2023-11-26
Payer: MEDICARE

## 2023-11-26 VITALS
SYSTOLIC BLOOD PRESSURE: 134 MMHG | HEART RATE: 74 BPM | HEIGHT: 65 IN | OXYGEN SATURATION: 96 % | WEIGHT: 135 LBS | TEMPERATURE: 97 F | BODY MASS INDEX: 22.49 KG/M2 | DIASTOLIC BLOOD PRESSURE: 86 MMHG

## 2023-11-26 DIAGNOSIS — B02.29 POST HERPETIC NEURALGIA: Primary | ICD-10-CM

## 2023-11-26 PROCEDURE — 1159F MED LIST DOCD IN RCRD: CPT

## 2023-11-26 PROCEDURE — 3008F BODY MASS INDEX DOCD: CPT

## 2023-11-26 PROCEDURE — 99213 OFFICE O/P EST LOW 20 MIN: CPT

## 2023-11-26 PROCEDURE — 3075F SYST BP GE 130 - 139MM HG: CPT

## 2023-11-26 PROCEDURE — 3079F DIAST BP 80-89 MM HG: CPT

## 2023-11-26 PROCEDURE — 1160F RVW MEDS BY RX/DR IN RCRD: CPT

## 2023-11-26 RX ORDER — ASPIRIN 81 MG/1
81 TABLET ORAL DAILY
COMMUNITY

## 2023-12-26 DIAGNOSIS — E06.3 HYPOTHYROIDISM DUE TO HASHIMOTO'S THYROIDITIS: ICD-10-CM

## 2023-12-26 DIAGNOSIS — E03.8 HYPOTHYROIDISM DUE TO HASHIMOTO'S THYROIDITIS: ICD-10-CM

## 2023-12-26 LAB
CREATININE: 0.7 MG/DL (ref 0.6–1.2)
GLOM FILT RATE, EST: 83.3
HEMOGLOBIN A1C: 7.3 % (ref 4.3–5.6)

## 2023-12-26 RX ORDER — LEVOTHYROXINE SODIUM 0.05 MG/1
50 TABLET ORAL DAILY
Qty: 90 TABLET | Refills: 3 | Status: SHIPPED | OUTPATIENT
Start: 2023-12-26

## 2023-12-26 NOTE — TELEPHONE ENCOUNTER
Levothyroxine Sodium 50 Mcg Tab Sonny          Will file in chart as: LEVOTHYROXINE 50 MCG Oral Tab    Sig: Take 1 tablet (50 mcg total) by mouth daily. Disp: 90 tablet    Refills: 0    Start: 12/26/2023    Class: Normal    Non-formulary For: Hypothyroidism due to Hashimoto's thyroiditis    Last ordered: 11 months ago (1/10/2023) by Fabien Rose MD    Last refill: 9/25/2023    Rx #: [id number]    Thyroid Supplements Protocol [id number]/26/2023 01:32 AM   Protocol Details TSH test in past 12 months    TSH value between 0.350 and 5.500 IU/ml    Appointment in past 12 or next 3 months      LOV 6/7/23  RTC 6 months  Last labs 9/5/23  No future appointments.

## 2024-01-15 NOTE — PATIENT INSTRUCTIONS
Call to make appointment with ENT if hearing loss has not improved in the next 24-48 hours after flushing.      Fasting blood work
(4) rarely moist

## 2024-01-18 ENCOUNTER — TELEPHONE (OUTPATIENT)
Dept: INTERNAL MEDICINE CLINIC | Facility: CLINIC | Age: 79
End: 2024-01-18

## 2024-01-18 DIAGNOSIS — Z12.31 ENCOUNTER FOR SCREENING MAMMOGRAM FOR MALIGNANT NEOPLASM OF BREAST: Primary | ICD-10-CM

## 2024-01-18 NOTE — TELEPHONE ENCOUNTER
Patient called to request order for Routine mammogram to be placed before next appointment. Patient requesting a call to inform when order has been placed no mychart.     Please advise.

## 2024-01-18 NOTE — TELEPHONE ENCOUNTER
LS - pended mammogram order d/t > 1 year. Last was 11/4/22 with results:    CONCLUSION:       BI-RADS CATEGORY:    DIAGNOSTIC CATEGORY 2--BENIGN FINDING NO CHANGE FROM COMPARISON ASSESSMENT.       RECOMMENDATIONS:    ROUTINE MAMMOGRAM AND CLINICAL EVALUATION IN 12 MONTHS.        Please advise, ty!

## 2024-02-09 ENCOUNTER — HOSPITAL ENCOUNTER (OUTPATIENT)
Dept: MAMMOGRAPHY | Age: 79
Discharge: HOME | End: 2024-02-09
Attending: INTERNAL MEDICINE
Payer: MEDICARE

## 2024-02-09 DIAGNOSIS — Z12.31 ENCOUNTER FOR SCREENING MAMMOGRAM FOR MALIGNANT NEOPLASM OF BREAST: ICD-10-CM

## 2024-02-09 PROCEDURE — 77063 BREAST TOMOSYNTHESIS BI: CPT | Performed by: INTERNAL MEDICINE

## 2024-02-09 PROCEDURE — 77067 SCR MAMMO BI INCL CAD: CPT | Performed by: INTERNAL MEDICINE

## 2024-02-15 ENCOUNTER — TELEPHONE (OUTPATIENT)
Dept: INTERNAL MEDICINE CLINIC | Facility: CLINIC | Age: 79
End: 2024-02-15

## 2024-02-15 DIAGNOSIS — E11.9 TYPE 2 DIABETES MELLITUS WITHOUT COMPLICATION, WITHOUT LONG-TERM CURRENT USE OF INSULIN (HCC): Primary | ICD-10-CM

## 2024-03-06 RX ORDER — LISINOPRIL 40 MG/1
40 TABLET ORAL DAILY
Qty: 90 TABLET | Refills: 3 | Status: SHIPPED | OUTPATIENT
Start: 2024-03-06

## 2024-03-06 NOTE — TELEPHONE ENCOUNTER
Requested Renewals     Name from pharmacy: Lisinopril 40 Mg Tab Lupi         Will file in chart as: LISINOPRIL 40 MG Oral Tab    Sig: Take 1 tablet (40 mg total) by mouth daily.    Disp: 90 tablet    Refills: 0    Start: 3/6/2024    Class: Normal    Non-formulary    Last ordered: 1 year ago (2/6/2023) by Esme Key MD    Last refill: 12/8/2023    Rx #: [id number]    Hypertension Medications Protocol [id number]/06/2024 01:30 AM   Protocol Details CMP or BMP in past 12 months    EGFRCR or GFRNAA > 50    Last BP reading less than 140/90    In person appointment or virtual visit in the past 12 mos or appointment in next 3 mos      To be filled at: [id number] - [address] [phone], [phone]           LOV: 06/07/2023  RTC:6 months  RECENT LABS: none  FOV: 03/11/2024

## 2024-03-07 ENCOUNTER — PATIENT OUTREACH (OUTPATIENT)
Dept: INTERNAL MEDICINE CLINIC | Facility: CLINIC | Age: 79
End: 2024-03-07

## 2024-03-11 ENCOUNTER — OFFICE VISIT (OUTPATIENT)
Dept: INTERNAL MEDICINE CLINIC | Facility: CLINIC | Age: 79
End: 2024-03-11
Payer: MEDICARE

## 2024-03-11 ENCOUNTER — LAB ENCOUNTER (OUTPATIENT)
Dept: LAB | Age: 79
End: 2024-03-11
Attending: INTERNAL MEDICINE
Payer: MEDICARE

## 2024-03-11 VITALS
TEMPERATURE: 98 F | WEIGHT: 132.81 LBS | BODY MASS INDEX: 22.13 KG/M2 | DIASTOLIC BLOOD PRESSURE: 78 MMHG | HEART RATE: 88 BPM | SYSTOLIC BLOOD PRESSURE: 136 MMHG | HEIGHT: 65 IN | OXYGEN SATURATION: 96 %

## 2024-03-11 DIAGNOSIS — E11.69 MIXED HYPERLIPIDEMIA DUE TO TYPE 2 DIABETES MELLITUS (HCC): ICD-10-CM

## 2024-03-11 DIAGNOSIS — R30.0 DYSURIA: ICD-10-CM

## 2024-03-11 DIAGNOSIS — Z00.00 WELLNESS EXAMINATION: Primary | ICD-10-CM

## 2024-03-11 DIAGNOSIS — R71.8 ABNORMAL RBC INDICES: ICD-10-CM

## 2024-03-11 DIAGNOSIS — H61.21 IMPACTED CERUMEN OF RIGHT EAR: ICD-10-CM

## 2024-03-11 DIAGNOSIS — N39.0 RECURRENT UTI: ICD-10-CM

## 2024-03-11 DIAGNOSIS — E11.9 TYPE 2 DIABETES MELLITUS WITHOUT COMPLICATION, WITHOUT LONG-TERM CURRENT USE OF INSULIN (HCC): ICD-10-CM

## 2024-03-11 DIAGNOSIS — Z63.6 CAREGIVER STRESS: ICD-10-CM

## 2024-03-11 DIAGNOSIS — E06.3 HYPOTHYROIDISM DUE TO HASHIMOTO'S THYROIDITIS: ICD-10-CM

## 2024-03-11 DIAGNOSIS — E78.2 MIXED HYPERLIPIDEMIA DUE TO TYPE 2 DIABETES MELLITUS (HCC): ICD-10-CM

## 2024-03-11 DIAGNOSIS — I10 ESSENTIAL HYPERTENSION: ICD-10-CM

## 2024-03-11 DIAGNOSIS — E03.8 HYPOTHYROIDISM DUE TO HASHIMOTO'S THYROIDITIS: ICD-10-CM

## 2024-03-11 LAB
ALBUMIN SERPL-MCNC: 3.9 G/DL (ref 3.4–5)
ALBUMIN/GLOB SERPL: 1.2 {RATIO} (ref 1–2)
ALP LIVER SERPL-CCNC: 54 U/L
ALT SERPL-CCNC: 26 U/L
ANION GAP SERPL CALC-SCNC: 6 MMOL/L (ref 0–18)
AST SERPL-CCNC: 17 U/L (ref 15–37)
BILIRUB SERPL-MCNC: 0.6 MG/DL (ref 0.1–2)
BILIRUB UR QL STRIP.AUTO: NEGATIVE
BUN BLD-MCNC: 17 MG/DL (ref 9–23)
CALCIUM BLD-MCNC: 9.7 MG/DL (ref 8.5–10.1)
CHLORIDE SERPL-SCNC: 104 MMOL/L (ref 98–112)
CO2 SERPL-SCNC: 27 MMOL/L (ref 21–32)
COLOR UR AUTO: YELLOW
CREAT BLD-MCNC: 0.8 MG/DL
CREAT UR-SCNC: 185 MG/DL
EGFRCR SERPLBLD CKD-EPI 2021: 76 ML/MIN/1.73M2 (ref 60–?)
EST. AVERAGE GLUCOSE BLD GHB EST-MCNC: 174 MG/DL (ref 68–126)
FASTING STATUS PATIENT QL REPORTED: NO
GLOBULIN PLAS-MCNC: 3.3 G/DL (ref 2.8–4.4)
GLUCOSE BLD-MCNC: 149 MG/DL (ref 70–99)
GLUCOSE UR STRIP.AUTO-MCNC: NORMAL MG/DL
HBA1C MFR BLD: 7.7 % (ref ?–5.7)
KETONES UR STRIP.AUTO-MCNC: 20 MG/DL
LEUKOCYTE ESTERASE UR QL STRIP.AUTO: 250
MICROALBUMIN UR-MCNC: 3.96 MG/DL
MICROALBUMIN/CREAT 24H UR-RTO: 21.4 UG/MG (ref ?–30)
NITRITE UR QL STRIP.AUTO: NEGATIVE
OSMOLALITY SERPL CALC.SUM OF ELEC: 288 MOSM/KG (ref 275–295)
PH UR STRIP.AUTO: 6 [PH] (ref 5–8)
POTASSIUM SERPL-SCNC: 4.4 MMOL/L (ref 3.5–5.1)
PROT SERPL-MCNC: 7.2 G/DL (ref 6.4–8.2)
PROT UR STRIP.AUTO-MCNC: 20 MG/DL
RBC UR QL AUTO: NEGATIVE
SODIUM SERPL-SCNC: 137 MMOL/L (ref 136–145)
SP GR UR STRIP.AUTO: 1.03 (ref 1–1.03)
UROBILINOGEN UR STRIP.AUTO-MCNC: 2 MG/DL

## 2024-03-11 PROCEDURE — 87077 CULTURE AEROBIC IDENTIFY: CPT

## 2024-03-11 PROCEDURE — 36415 COLL VENOUS BLD VENIPUNCTURE: CPT

## 2024-03-11 PROCEDURE — 82570 ASSAY OF URINE CREATININE: CPT

## 2024-03-11 PROCEDURE — 87086 URINE CULTURE/COLONY COUNT: CPT

## 2024-03-11 PROCEDURE — 87186 SC STD MICRODIL/AGAR DIL: CPT

## 2024-03-11 PROCEDURE — 83036 HEMOGLOBIN GLYCOSYLATED A1C: CPT

## 2024-03-11 PROCEDURE — 82043 UR ALBUMIN QUANTITATIVE: CPT

## 2024-03-11 PROCEDURE — 81001 URINALYSIS AUTO W/SCOPE: CPT

## 2024-03-11 PROCEDURE — 80053 COMPREHEN METABOLIC PANEL: CPT

## 2024-03-11 SDOH — SOCIAL STABILITY - SOCIAL INSECURITY: DEPENDENT RELATIVE NEEDING CARE AT HOME: Z63.6

## 2024-03-11 NOTE — PATIENT INSTRUCTIONS
Blood work and urine      Info for your :  You can check with your insurance about which home hospice company is in network for you   [facility]idential home [facility]  [address] [place] [address] [address], [place] [address]  Phone: [phone]

## 2024-03-11 NOTE — PROGRESS NOTES
Subjective:   Melina Upton is a 78 year old female who presents for a MA (Medicare Advantage) Supervisit (Once per calendar year) and scheduled follow up of multiple significant but stable problems.     Shingles started in April 2023 and she has had a difficult course with postherpatic neuralgia     She saw endo for DM last in 9/2023  A1c 9/5/23 7.3; due for A1c  On victoza, glimepiride, and metformin     She note she was moving things around and \"wants to ask about a hernia\"   She carried a heavy water bottle  She noted a bulge in the lateral abdomen     HTN - on amlodipine 5mg and lisinopril 40mg    Hypothyroidism - on levothyroxine 50mcg   TSH 2.14 in 9/2023    Her  has been ill and declining treatment.   She has emotional support from her sons       History/Other:   Fall Risk Assessment:   She has been screened for Falls and is low risk.      Cognitive Assessment:   She had a completely normal cognitive assessment - see flowsheet entries       Functional Ability/Status:   Melina Upton has a completely normal functional assessment. See flowsheet for details.      Depression Screening (PHQ-2/PHQ-9): PHQ-2 SCORE: 0  , done 3/11/2024         Advanced Directives:   She does have a Living Will but we do NOT have it on file in Epic.    She does have a POA but we do NOT have it on file in [employer].    Patient has Advance Care Planning documents but we do not have a copy in EMR. Discussed Advanced Care Planning with patient and instructed patient to get our office a copy to be scanned into EMR.      Patient Active Problem List   Diagnosis    Hypothyroidism    Essential hypertension    Type 2 diabetes mellitus without complication, without long-term current use of insulin (HCC)    Mixed hyperlipidemia due to type 2 diabetes mellitus (HCC)    Recurrent UTI    Abnormal RBC indices    Caregiver stress     Allergies:  She is allergic to ciprofloxacin.    Current Medications:  Outpatient Medications Marked as  Taking for the 3/11/24 encounter (Office Visit) with Esme Key MD   Medication Sig    lisinopril 40 MG Oral Tab Take 1 tablet (40 mg total) by mouth daily.    levothyroxine 50 MCG Oral Tab Take 1 tablet (50 mcg total) by mouth daily.    aspirin 81 MG Oral Tab EC Take 1 tablet (81 mg total) by mouth daily.    metoprolol tartrate 50 MG Oral Tab Take 1 tablet (50 mg total) by mouth 2 (two) times daily.    amLODIPine 5 MG Oral Tab Take 1 tablet (5 mg total) by mouth daily.    DROPLET PEN NEEDLES 32G X 4 MM Does not apply Misc USE DAILY    metFORMIN HCl 1000 MG Oral Tab Take 1 tablet (1,000 mg total) by mouth 2 (two) times daily with meals.    Liraglutide (VICTOZA) 18 MG/3ML Subcutaneous Solution Pen-injector INJECT 1.8MG SUBCUTANEOUSLY EVERY DAY    glimepiride 4 MG Oral Tab Take 1 tablet (4 mg total) by mouth 2 (two) times daily.    Glucose Blood (TRUE METRIX BLOOD GLUCOSE TEST) In Vitro Strip Take 1 Bottle by mouth As Directed.    Blood Glucose Calibration (TRUE METRIX LEVEL 3) High In Vitro Solution 1 Bottle by In Vitro route daily.    Alcohol Swabs (BD SWAB SINGLE USE REGULAR) Does not apply Pads USE AS DIRECTED    Insulin Pen Needle (BD PEN NEEDLE YON U/F) 32G X 4 MM Does not apply Misc Use 6x daily    Blood Glucose Calibration (TRUE METRIX LEVEL 1) Low In Vitro Solution 1 Bottle by In Vitro route daily.       Medical History:  She  has a past medical history of Abnormal mammogram (8/5/2022), Bronchospasm (12/16/2017), Community acquired pneumonia (12/16/2017), Dry skin (3/7/2019), Essential hypertension, Hypothyroidism, and Toe problem (3/7/2019).  Surgical History:  She  has a past surgical history that includes hysterectomy; removal gallbladder; other surgical history (09/09/2020); and needle biopsy left.   Family History:  Her family history includes Hypertension in her mother.  Social History:  She  reports that she has never smoked. She has never used smokeless tobacco. She reports that she does not  drink alcohol and does not use drugs.    Tobacco:  She has never smoked tobacco.    CAGE Alcohol Screen:   CAGE screening score of 0 on 3/11/2024, showing low risk of alcohol abuse.      Patient Care Team:  Esme Key MD as PCP - General (Internal Medicine)    Review of Systems   Constitutional:  Negative for fever.   HENT:  Negative for congestion.    Eyes:  Negative for visual disturbance.   Respiratory:  Negative for shortness of breath.    Cardiovascular:  Negative for chest pain.   Gastrointestinal:  Negative for constipation.   Genitourinary:  Negative for dysuria.   Neurological: Negative.    Hematological: Negative.    Psychiatric/Behavioral: Negative.           Objective:   Physical Exam  Vitals reviewed.   Constitutional:       General: She is not in acute distress.     Appearance: She is well-developed.   HENT:      Head: Normocephalic and atraumatic.      Right Ear: Tympanic membrane normal.      Left Ear: Tympanic membrane normal.   Eyes:      Conjunctiva/sclera: Conjunctivae normal.   Cardiovascular:      Rate and Rhythm: Normal rate and regular rhythm.      Heart sounds: Normal heart sounds.   Pulmonary:      Effort: Pulmonary effort is normal.      Breath sounds: Normal breath sounds.   Abdominal:      Palpations: Abdomen is soft. There is no mass.      Tenderness: There is no abdominal tenderness.      Hernia: No hernia is present.   Musculoskeletal:      Cervical back: Neck supple.      Right lower leg: No edema.      Left lower leg: No edema.   Skin:     General: Skin is warm and dry.   Neurological:      General: No focal deficit present.      Mental Status: She is alert.   Psychiatric:         Mood and Affect: Mood normal.     Right ear cerumen impaction         /78   Pulse 88   Temp 98.4 °F (36.9 °C) (Temporal)   Ht 5' 5\" (1.651 m)   Wt 132 lb 12.8 oz (60.2 kg)   SpO2 96%   BMI 22.10 kg/m²  Estimated body mass index is 22.1 kg/m² as calculated from the following:    Height as of  this encounter: 5' 5\" (1.651 m).    Weight as of this encounter: 132 lb 12.8 oz (60.2 kg).    Medicare Hearing Assessment:   Hearing Screening    Time taken: 3/11/2024  8:35 AM  Entry User: Lavern Uriostegui  Screening Method: Finger Rub  Finger Rub Result: Pass                     Assessment & Plan:   Melina Upton is a 78 year old female who presents for a Medicare Assessment.     1. Wellness examination (Primary)  -due for shingrix. Yearly flu and covid vaccines  2. Dysuria -new problem. Check Ua   -     Urinalysis with Culture Reflex; Future; Expected date: 03/11/2024  3. Essential hypertension -chronic, stable. Cont present management   -     Comp Metabolic Panel (14); Future; Expected date: 03/11/2024  4. Type 2 diabetes mellitus without complication, without long-term current use of insulin (HCC) -chronic, stable. Sees endo. Due for A1c -ordered today. Check urine microalbumin. Has upcoming ophtho appt   -     Microalb/Creat Ratio, Random Urine; Future; Expected date: 03/11/2024  -     Ophthalmology Referral - External  -     Hemoglobin A1C; Future; Expected date: 03/11/2024  5. Hypothyroidism due to Hashimoto's thyroiditis -chronic, stable. Cont levothyroxine. Last TSH 2.14 in 9/2023  6. Impacted cerumen of right ear -reactivation of previous problem. S/p flushing   7. Mixed hyperlipidemia due to type 2 diabetes mellitus (HCC -chronic, stable. Check labs   8. Recurrent UTI -chronic. Symptomatic today as above  9. Caregiver stress -chronic, stable. 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   10. Abnormal RBC indices - not active problem. normal WBC, Hgb and Plt previously     The patient indicates understanding of these issues and agrees to the plan.  Reinforced healthy diet, lifestyle, and exercise.      Return in about 4 months (around 7/11/2024).     Esme Key MD, 3/11/2024     Supplementary Documentation:   General Health:  In the past six months, have you lost more than 10 pounds  without trying?: 1 - Yes  Has your appetite been poor?: No  Type of Diet: Diabetic  How does the patient maintain a good energy level?: Other  How would you describe your daily physical activity?: Moderate  How would you describe your current health state?: Fair  How do you maintain positive mental well-being?: Visiting Friends;Visiting Family  On a scale of 0 to 10, with 0 being no pain and 10 being severe pain, what is your pain level?: 2 - (Mild)  In the past six months, have you experienced urine leakage?: 1-Yes  At any time do you feel concerned for the safety/well-being of yourself and/or your children, in your home or elsewhere?: No  Have you had any immunizations at another office such as Influenza, Hepatitis B, Tetanus, or Pneumococcal?: No       Melina Upton's SCREENING SCHEDULE   Tests on this list are recommended by your physician but may not be covered, or covered at this frequency, by your insurer.   Please check with your insurance carrier before scheduling to verify coverage.   PREVENTATIVE SERVICES FREQUENCY &  COVERAGE DETAILS LAST COMPLETION DATE   Diabetes Screening    Fasting Blood Sugar /  Glucose    One screening every 12 months if never tested or if previously tested but not diagnosed with pre-diabetes   One screening every 6 months if diagnosed with pre-diabetes Lab Results   Component Value Date    GLU 99 09/20/2022        Cardiovascular Disease Screening    Lipid Panel  Cholesterol  Lipoprotein (HDL)  Triglycerides Covered every 5 years for all Medicare beneficiaries without apparent signs or symptoms of cardiovascular disease Lab Results   Component Value Date    CHOLEST 114 09/20/2022    HDL 70 (H) 09/20/2022    LDL 30 09/20/2022    TRIG 66 09/20/2022         Electrocardiogram (EKG)   Covered if needed at Welcome to Medicare, and non-screening if indicated for medical reasons 12/17/2017      Ultrasound Screening for Abdominal Aortic Aneurysm (AAA) Covered once in a lifetime for one  of the following risk factors    Men who are 65-75 years old and have ever smoked    Anyone with a family history -     Colorectal Cancer Screening  Covered for ages 50-85; only need ONE of the following:    Colonoscopy   Covered every 10 years    Covered every 2 years if patient is at high risk or previous colonoscopy was abnormal -    No recommendations at this time    Flexible Sigmoidoscopy   Covered every 4 years -    Fecal Occult Blood Test Covered annually -   Bone Density Screening    Bone density screening    Covered every 2 years after age 65 if diagnosed with risk of osteoporosis or estrogen deficiency.    Covered yearly for long-term glucocorticoid medication use (Steroids) Last Dexa Scan:    XR DEXA BONE DENSITOMETRY (CPT=77080) 03/28/2019      No recommendations at this time   Pap and Pelvic    Pap   Covered every 2 years for women at normal risk; Annually if at high risk -  No recommendations at this time    Chlamydia Annually if high risk -  No recommendations at this time   Screening Mammogram    Mammogram     Recommend annually for all female patients aged 40 and older    One baseline mammogram covered for patients aged 35-39 02/09/2024    Health Maintenance   Topic Date Due    Mammogram  Discontinued       Immunizations    Influenza Covered once per flu season  Please get every year -  Influenza Vaccine(1) due on 10/01/2023    Pneumococcal Each vaccine ([id number] & [id number]) covered once after 65 Prevnar 13: -    [id number]: -     No recommendations at this time    Hepatitis B One screening covered for patients with certain risk factors   -  No recommendations at this time    Tetanus Toxoid Not covered by Medicare Part B unless medically necessary (cut with metal); may be covered with your pharmacy prescription benefits -    Tetanus, Diptheria and Pertusis TD and TDaP Not covered by Medicare Part B -  No recommendations at this time    Zoster Not covered by Medicare Part B; may be covered with  your pharmacy  prescription benefits -  Zoster Vaccines(1 of 2) Never done     Diabetes      Hemoglobin A1C Annually; if last result is elevated, may be asked to retest more frequently.    Medicare covers every 3 months Lab Results   Component Value Date     08/25/2021    A1C 7.3 (A) 09/05/2023       Hemoglobin A1C Test due on 03/05/2024    Creat/alb ratio Annually Lab Results   Component Value Date    MICROALBCREA 13.1 09/20/2022       LDL Annually Lab Results   Component Value Date    LDL 30 09/20/2022       Dilated Eye Exam Annually Last Diabetic Eye Exam:  Last Dilated Eye Exam: 06/13/23  Eye Exam shows Diabetic Retinopathy?: No       Annual Monitoring of Persistent Medications (ACE/ARB, digoxin diuretics, anticonvulsants)    Potassium Annually Lab Results   Component Value Date    K 4.4 09/20/2022         Creatinine   Annually Lab Results   Component Value Date    CREATSERUM 0.81 09/20/2022         BUN Annually Lab Results   Component Value Date    BUN 15 09/20/2022       Drug Serum Conc Annually No results found for: \"DIGOXIN\", \"DIG\", \"VALP\"

## 2024-03-13 ENCOUNTER — TELEPHONE (OUTPATIENT)
Dept: INTERNAL MEDICINE CLINIC | Facility: CLINIC | Age: 79
End: 2024-03-13

## 2024-03-13 RX ORDER — SULFAMETHOXAZOLE AND TRIMETHOPRIM 800; 160 MG/1; MG/1
1 TABLET ORAL 2 TIMES DAILY
Qty: 10 TABLET | Refills: 0 | Status: SHIPPED | OUTPATIENT
Start: 2024-03-13

## 2024-03-13 NOTE — TELEPHONE ENCOUNTER
[phone] spoke to pt, provided all lab results. Pt will  the antibiotic right away.     Encouraged pt to call the office if she is not feeling better after 72 hours. Pt v/u.

## 2024-03-13 NOTE — TELEPHONE ENCOUNTER
Urine culture positive for UTI infection. Trimethoprim-sulfa (bactrim is brand name) antibiotic was sent to your [place] pharmacy.     Please give info on result note for her labs as well

## 2024-04-24 RX ORDER — LISINOPRIL 40 MG/1
40 TABLET ORAL DAILY
Qty: 90 TABLET | Refills: 3 | Status: SHIPPED | OUTPATIENT
Start: 2024-04-24

## 2024-04-24 RX ORDER — METOPROLOL TARTRATE 50 MG/1
50 TABLET, FILM COATED ORAL 2 TIMES DAILY
Qty: 180 TABLET | Refills: 3 | Status: SHIPPED | OUTPATIENT
Start: 2024-04-24

## 2024-04-24 RX ORDER — AMLODIPINE BESYLATE 5 MG/1
5 TABLET ORAL DAILY
Qty: 90 TABLET | Refills: 3 | Status: SHIPPED | OUTPATIENT
Start: 2024-04-24

## 2024-06-07 RX ORDER — AMLODIPINE BESYLATE 5 MG/1
5 TABLET ORAL DAILY
Qty: 90 TABLET | Refills: 0 | OUTPATIENT
Start: 2024-06-07

## 2024-06-07 NOTE — TELEPHONE ENCOUNTER
Amlodipine Besylate 5 Mg Tab Unic          Will file in chart as: AMLODIPINE 5 MG Oral Tab    Sig: Take 1 tablet (5 mg total) by mouth daily.    Disp: 90 tablet    Refills: 0    Start: 6/7/2024    Class: Normal    Non-formulary    Last ordered: 1 month ago (4/24/2024) by Esme Key MD    Last refill: 3/6/2024    Rx #: [id number]    Hypertension Medications Protocol [id number]/07/2024 02:32 AM   Protocol Details CMP or BMP in past 12 months    Last BP reading less than 140/90    In person appointment or virtual visit in the past 12 mos or appointment in next 3 mos    EGFRCR or GFRNAA > 50      LOV 3/11/24  RTC 4 months  Filled one year supply on 4/24/24  Future Appointments   Date Time Provider Department Center   7/10/2024  8:00 AM Esme Key MD [facility] [place]

## 2024-06-25 DIAGNOSIS — E11.9 TYPE 2 DIABETES MELLITUS WITHOUT COMPLICATION, WITHOUT LONG-TERM CURRENT USE OF INSULIN (HCC): ICD-10-CM

## 2024-06-26 NOTE — TELEPHONE ENCOUNTER
Protocol failed     Metformin 1000mg     LOV: 3/11/24   RTC: 4 months   Filled: 8/27/21 #180 3 refills   Labs: 3/11/24   Future Appointments   Date Time Provider Department Center   7/10/2024  8:00 AM Esme Key MD [facility] [place]

## 2024-07-10 ENCOUNTER — OFFICE VISIT (OUTPATIENT)
Dept: INTERNAL MEDICINE CLINIC | Facility: CLINIC | Age: 79
End: 2024-07-10
Payer: MEDICARE

## 2024-07-10 VITALS
SYSTOLIC BLOOD PRESSURE: 130 MMHG | RESPIRATION RATE: 16 BRPM | BODY MASS INDEX: 22.19 KG/M2 | HEIGHT: 65 IN | OXYGEN SATURATION: 97 % | DIASTOLIC BLOOD PRESSURE: 70 MMHG | HEART RATE: 73 BPM | TEMPERATURE: 98 F | WEIGHT: 133.19 LBS

## 2024-07-10 DIAGNOSIS — E06.3 HYPOTHYROIDISM DUE TO HASHIMOTO'S THYROIDITIS: ICD-10-CM

## 2024-07-10 DIAGNOSIS — Z63.6 CAREGIVER STRESS: ICD-10-CM

## 2024-07-10 DIAGNOSIS — H93.8X1 SENSATION OF FULLNESS IN RIGHT EAR: ICD-10-CM

## 2024-07-10 DIAGNOSIS — E11.9 TYPE 2 DIABETES MELLITUS WITHOUT COMPLICATION, WITHOUT LONG-TERM CURRENT USE OF INSULIN (HCC): ICD-10-CM

## 2024-07-10 DIAGNOSIS — I10 ESSENTIAL HYPERTENSION: Primary | ICD-10-CM

## 2024-07-10 DIAGNOSIS — H91.91 DECREASED HEARING OF RIGHT EAR: ICD-10-CM

## 2024-07-10 PROCEDURE — 1160F RVW MEDS BY RX/DR IN RCRD: CPT | Performed by: INTERNAL MEDICINE

## 2024-07-10 PROCEDURE — 3078F DIAST BP <80 MM HG: CPT | Performed by: INTERNAL MEDICINE

## 2024-07-10 PROCEDURE — 3075F SYST BP GE 130 - 139MM HG: CPT | Performed by: INTERNAL MEDICINE

## 2024-07-10 PROCEDURE — 99214 OFFICE O/P EST MOD 30 MIN: CPT | Performed by: INTERNAL MEDICINE

## 2024-07-10 PROCEDURE — 1159F MED LIST DOCD IN RCRD: CPT | Performed by: INTERNAL MEDICINE

## 2024-07-10 PROCEDURE — 3008F BODY MASS INDEX DOCD: CPT | Performed by: INTERNAL MEDICINE

## 2024-07-10 RX ORDER — FLUTICASONE PROPIONATE 50 MCG
2 SPRAY, SUSPENSION (ML) NASAL DAILY
Qty: 9.9 ML | Refills: 0 | Status: SHIPPED | OUTPATIENT
Start: 2024-07-10 | End: 2025-07-05

## 2024-07-10 SDOH — SOCIAL STABILITY - SOCIAL INSECURITY: DEPENDENT RELATIVE NEEDING CARE AT HOME: Z63.6

## 2024-07-10 NOTE — PATIENT INSTRUCTIONS
Call to schedule appointment with Dr. Beasley for eye exam     If Victoza continues to not be available, consider changing to Ozempic or Trulicity and can discuss with your endocrinologist     Start fluticasone nasal spray 2 sprays each nostril once a day to help with your ear. If symptoms do not improve over the next week, please call to make an appointment with ENT (ear, nose, and throat) specialist

## 2024-07-10 NOTE — PROGRESS NOTES
[facility] Internal Medicine Office Note  Chief Complaint:   Chief Complaint   Patient presents with    Follow - Up       HPI:   This is a 79 year old female coming in for HTN, DM   HPI    DM - Victoza on backorder  173 glu this morning  She has upcoming appt with Dr. Metcalf   Due for dilated eye exam     She has decreased hearing of the right ear. She felt an earache on July 4 and that has resolved. She has had pressure in the ear     Her  is in poor health and patient notes stress with caring for him and all of her responsibilities     Patient Active Problem List   Diagnosis    Hypothyroidism    Essential hypertension    Type 2 diabetes mellitus without complication, without long-term current use of insulin (HCC)    Mixed hyperlipidemia due to type 2 diabetes mellitus (HCC)    Recurrent UTI    Abnormal RBC indices    Caregiver stress     Past Surgical History:   Procedure Laterality Date    Hysterectomy      Needle biopsy left      Other surgical history  09/09/2020    Cystoscopy (Dr. Martin)    Removal gallbladder       Family History   Problem Relation Age of Onset    Hypertension Mother         I reviewed her's Past Medical History, Past Surgical History, Family History and   Social History updated shows  Social History     Socioeconomic History    Marital status:    Tobacco Use    Smoking status: Never    Smokeless tobacco: Never   Vaping Use    Vaping status: Never Used   Substance and Sexual Activity    Alcohol use: No    Drug use: No   Other Topics Concern    Caffeine Concern No    Exercise Yes     Allergies:  Allergies   Allergen Reactions    Ciprofloxacin OTHER (SEE COMMENTS)     Tendinitis       Current Outpatient Medications   Medication Sig Dispense Refill    metFORMIN HCl 1000 MG Oral Tab Take 1 tablet (1,000 mg total) by mouth 2 (two) times daily with meals. 180 tablet 3    amLODIPine 5 MG Oral Tab Take 1 tablet (5 mg total) by mouth daily. 90 tablet 3    lisinopril 40 MG Oral  Tab Take 1 tablet (40 mg total) by mouth daily. 90 tablet 3    metoprolol tartrate 50 MG Oral Tab Take 1 tablet (50 mg total) by mouth 2 (two) times daily. 180 tablet 3    levothyroxine 50 MCG Oral Tab Take 1 tablet (50 mcg total) by mouth daily. 90 tablet 3    aspirin 81 MG Oral Tab EC Take 1 tablet (81 mg total) by mouth daily.      DROPLET PEN NEEDLES 32G X 4 MM Does not apply Misc USE DAILY 100 each 0    glimepiride 4 MG Oral Tab Take 1 tablet (4 mg total) by mouth 2 (two) times daily. 180 tablet 3    Glucose Blood (TRUE METRIX BLOOD GLUCOSE TEST) In Vitro Strip Take 1 Bottle by mouth As Directed. 200 strip 3    Blood Glucose Calibration (TRUE METRIX LEVEL 3) High In Vitro Solution 1 Bottle by In Vitro route daily. 100 each 3    Alcohol Swabs (BD SWAB SINGLE USE REGULAR) Does not apply Pads USE AS DIRECTED 200 each 3    Insulin Pen Needle (BD PEN NEEDLE YON U/F) 32G X 4 MM Does not apply Misc Use 6x daily 600 each 1    Blood Glucose Calibration (TRUE METRIX LEVEL 1) Low In Vitro Solution 1 Bottle by In Vitro route daily. 1 each 0    Liraglutide (VICTOZA) 18 MG/3ML Subcutaneous Solution Pen-injector INJECT 1.8MG SUBCUTANEOUSLY EVERY DAY (Patient not taking: Reported on 7/10/2024) 27 mL 2         REVIEW OF SYSTEMS:   Review of Systems   Constitutional:  Negative for fever.   HENT:  Negative for congestion.    Eyes:  Negative for visual disturbance.   Respiratory:  Negative for shortness of breath.    Cardiovascular:  Negative for chest pain.   Gastrointestinal:  Negative for constipation.   Genitourinary:  Negative for dysuria.   Neurological: Negative.    Hematological: Negative.    Psychiatric/Behavioral: Negative.          EXAM:   /70   Pulse 73   Temp 97.5 °F (36.4 °C) (Temporal)   Resp 16   Ht 5' 5\" (1.651 m)   Wt 133 lb 3.2 oz (60.4 kg)   SpO2 97%   BMI 22.17 kg/m²  Estimated body mass index is 22.17 kg/m² as calculated from the following:    Height as of this encounter: 5' 5\" (1.651 m).     Weight as of this encounter: 133 lb 3.2 oz (60.4 kg).   Vital signs reviewed. Appears stated age, well groomed.  Physical Exam  Vitals reviewed.   Constitutional:       General: She is not in acute distress.     Appearance: She is well-developed.   HENT:      Head: Normocephalic and atraumatic.   Eyes:      Conjunctiva/sclera: Conjunctivae normal.   Cardiovascular:      Rate and Rhythm: Normal rate and regular rhythm.      Heart sounds: Normal heart sounds.   Pulmonary:      Effort: Pulmonary effort is normal.      Breath sounds: Normal breath sounds.   Musculoskeletal:      Cervical back: Neck supple.   Skin:     General: Skin is warm and dry.   Neurological:      General: No focal deficit present.      Mental Status: She is alert.   Psychiatric:         Mood and Affect: Mood normal.      Bilateral barefoot skin diabetic exam is normal, visualized feet and the appearance is normal.  Bilateral monofilament/sensation of both feet is normal.  Pulsation pedal pulse exam of both lower legs/feet is normal as well.       ASSESSMENT AND PLAN:   Melina Upton is a 79 year old female with  1. Essential hypertension    2. Type 2 diabetes mellitus without complication, without long-term current use of insulin (HCC)    3. Hypothyroidism due to Hashimoto's thyroiditis    4. Sensation of fullness in right ear    5. Decreased hearing of right ear    6. Caregiver stress          The plan is as follows  Melina was seen today for follow - up.    Diagnoses and all orders for this visit:    Essential hypertension - at goal; cont present management with amlodipine 5mg, lisinopril 40mg, metoprolol 50mg BID     Type 2 diabetes mellitus without complication, without long-term current use of insulin (HCC) -victoza has been on backorder so her glu has been higher. She has appt with endo in a few weeks  -cont metformin and glimepiride   -due for eye exam  -     Lipid Panel; Future  -     Hemoglobin A1C; Future  -     Ophthalmology  Referral - External    Hypothyroidism due to Hashimoto's thyroiditis -cont levothyroxine; check labs     Sensation of fullness of the right ear and   Decreased hearing of right ear - c/w eustachian tube dysfunction. Start fluticasone nasal steroid 2 sprays each nostril once daily. Call ENT in a week if symptoms are not improving    Caregiver stress - she is concerned about burning out and feels very stressed. Her  is in poor health. She would like to see a counselor     Orders Placed This Encounter   Procedures    Lipid Panel    Hemoglobin A1C       Meds & Refills for this Visit:  Requested Prescriptions     Pending Prescriptions Disp Refills    fluticasone propionate 50 MCG/ACT Nasal Suspension 9.9 mL 0     Si sprays by Each Nare route daily.       Imaging & Consults:  OPHTHALMOLOGY - EXTERNAL  ENT - INTERNAL    Health Maintenance Due   Topic Date Due    Zoster Vaccines (1 of 2) Never done    COVID-19 Vaccine (3 - 2023- season) 2023    Diabetes Care Foot Exam  2024    Diabetes Care Dilated Eye Exam  2024     Patient/Caregiver Education: Patient/Caregiver Education: There are no barriers to learning. Medical education done. Outcome: Patient verbalizes understanding. Patient is notified to call with any questions, complications, allergies, or worsening or changing symptoms.  Patient is to call with any side effects or complications from the treatments as a result of today.     Esme Key MD

## 2024-07-11 ENCOUNTER — TELEPHONE (OUTPATIENT)
Dept: INTERNAL MEDICINE CLINIC | Facility: CLINIC | Age: 79
End: 2024-07-11

## 2025-01-17 ENCOUNTER — TELEPHONE (OUTPATIENT)
Dept: INTERNAL MEDICINE CLINIC | Facility: CLINIC | Age: 80
End: 2025-01-17

## 2025-01-17 DIAGNOSIS — E11.9 TYPE 2 DIABETES MELLITUS WITHOUT COMPLICATION, WITHOUT LONG-TERM CURRENT USE OF INSULIN (HCC): Primary | ICD-10-CM

## 2025-01-17 NOTE — TELEPHONE ENCOUNTER
[phone] - 1st attempt. Unable to leave voicemail  Dr. Key- Pended Ophthalmology referral for Dr. Beasley. Please sign if agree. TY

## 2025-01-17 NOTE — TELEPHONE ENCOUNTER
Referral Request     1. Insurance Verification  -Does the patient have an HMO insurance plan that requires a referral? YES  *Ensure insurance is updated and verified in [facility].  *PPO plans usually do not require an authorized referral. Advise the patient to call their insurance for a list of in-network providers.    2. Referral Process  -If a referral is needed:  -Is there already a valid authorized or pending referral in [facility]? NO If yes, notify the patient of the status and/or fax per patient request.  -Provider, specialty, and contact information (office phone and fax number):     Eliezer Beasley [id number]  [facility] [id number] [phone]     -Has the patient contacted their insurance plan to verify the specialist is in-network? YES  -Has the patient seen this specialist in the past? YES  -Does the patient have a scheduled appointment with the referral provider? NO If so, provide the date. If within 1 week, send TE as high priority.  -Diagnosis or reason for visit:  Eye injury   -Does the referral need to be sent via fax? NO Fax number: N/A    3. Patient Notification  -Notify the patient that referrals can take up to 1 week or sometimes longer for authorization.  -Patient call back number:   [phone]

## 2025-01-27 NOTE — TELEPHONE ENCOUNTER
Spoke to patient with referral authorization. Patient verbalized understanding   Time-based billing (NON-critical care)

## 2025-01-30 NOTE — TELEPHONE ENCOUNTER
Pt is calling saying she needs an additional ref to the following:    Dr. Gabriella Shore  [facility]   [address] [place], [address]  Ph# [phone]  Fax# [phone]    Pt says they are requesting a new ref w/ Dr. Shore. If not received, they can not schedule the pt. Pt also says she wants to see this doctor for cataracts.

## 2025-02-13 RX ORDER — METOPROLOL TARTRATE 50 MG
50 TABLET ORAL 2 TIMES DAILY
Qty: 180 TABLET | Refills: 3 | Status: SHIPPED | OUTPATIENT
Start: 2025-02-13

## 2025-02-13 NOTE — TELEPHONE ENCOUNTER
Protocol passed     LOV: 7/10/24   RTC: 6 months  Filled: 4/24/24 #180 3 refills   Labs: 3/11/24   Future Appointments   Date Time Provider Department Center   3/21/2025  9:00 AM Esme Key MD [facility] [place]

## 2025-03-01 RX ORDER — AMLODIPINE BESYLATE 5 MG/1
5 TABLET ORAL DAILY
Qty: 90 TABLET | Refills: 3 | Status: SHIPPED | OUTPATIENT
Start: 2025-03-01

## 2025-03-01 RX ORDER — LISINOPRIL 40 MG/1
40 TABLET ORAL DAILY
Qty: 90 TABLET | Refills: 3 | Status: SHIPPED | OUTPATIENT
Start: 2025-03-01

## 2025-04-16 DIAGNOSIS — E11.9 TYPE 2 DIABETES MELLITUS WITHOUT COMPLICATION, WITHOUT LONG-TERM CURRENT USE OF INSULIN (HCC): ICD-10-CM

## 2025-04-16 NOTE — TELEPHONE ENCOUNTER
Protocol failed     LOV: 7/10/24   RTC: 6 months  Filled: 6/26/24 #180 3 refill   Labs: 3/11/24  No future appointments.

## 2025-05-13 ENCOUNTER — TELEPHONE (OUTPATIENT)
Dept: INTERNAL MEDICINE CLINIC | Facility: CLINIC | Age: 80
End: 2025-05-13

## 2025-05-13 NOTE — TELEPHONE ENCOUNTER
Schedule MAS with patient   Future Appointments   Date Time Provider Department Center   5/23/2025  1:30 PM Esme Key MD [facility] [place]

## 2025-05-23 ENCOUNTER — OFFICE VISIT (OUTPATIENT)
Dept: INTERNAL MEDICINE CLINIC | Facility: CLINIC | Age: 80
End: 2025-05-23
Payer: MEDICARE

## 2025-05-23 VITALS
DIASTOLIC BLOOD PRESSURE: 70 MMHG | TEMPERATURE: 97 F | SYSTOLIC BLOOD PRESSURE: 136 MMHG | OXYGEN SATURATION: 96 % | BODY MASS INDEX: 22.13 KG/M2 | HEIGHT: 65 IN | RESPIRATION RATE: 16 BRPM | HEART RATE: 70 BPM | WEIGHT: 132.81 LBS

## 2025-05-23 DIAGNOSIS — R71.8 ABNORMAL RBC INDICES: ICD-10-CM

## 2025-05-23 DIAGNOSIS — N39.0 RECURRENT UTI: ICD-10-CM

## 2025-05-23 DIAGNOSIS — E11.9 TYPE 2 DIABETES MELLITUS WITHOUT COMPLICATION, WITHOUT LONG-TERM CURRENT USE OF INSULIN (HCC): ICD-10-CM

## 2025-05-23 DIAGNOSIS — Z12.31 SCREENING MAMMOGRAM FOR BREAST CANCER: ICD-10-CM

## 2025-05-23 DIAGNOSIS — E03.9 HYPOTHYROIDISM, UNSPECIFIED TYPE: ICD-10-CM

## 2025-05-23 DIAGNOSIS — Z00.00 WELLNESS EXAMINATION: Primary | ICD-10-CM

## 2025-05-23 DIAGNOSIS — I10 ESSENTIAL HYPERTENSION: ICD-10-CM

## 2025-05-23 DIAGNOSIS — Z63.6 CAREGIVER STRESS: ICD-10-CM

## 2025-05-23 DIAGNOSIS — H25.9 AGE-RELATED CATARACT OF BOTH EYES, UNSPECIFIED AGE-RELATED CATARACT TYPE: ICD-10-CM

## 2025-05-23 PROCEDURE — 96160 PT-FOCUSED HLTH RISK ASSMT: CPT | Performed by: INTERNAL MEDICINE

## 2025-05-23 PROCEDURE — G0439 PPPS, SUBSEQ VISIT: HCPCS | Performed by: INTERNAL MEDICINE

## 2025-05-23 RX ORDER — SEMAGLUTIDE 0.68 MG/ML
0.5 INJECTION, SOLUTION SUBCUTANEOUS WEEKLY
COMMUNITY

## 2025-05-23 SDOH — SOCIAL STABILITY - SOCIAL INSECURITY: DEPENDENT RELATIVE NEEDING CARE AT HOME: Z63.6

## 2025-05-23 NOTE — PROGRESS NOTES
Subjective:   Melina Upton is a 80 year old female who presents for a MA AHA (Medicare Advantage Annual Health Assessment) and Subsequent Annual Wellness visit (Pt already had Initial Annual Wellness) and scheduled follow up of multiple significant but stable problems.             She notes the referral from previous never went through and she needs cataract surgery.     DM - she notes she does not want additional medication of a statin. 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      Cognitive Assessment:   She had a completely normal cognitive assessment - see flowsheet entries     Functional Ability/Status:   Melina Upton has some abnormal functions as listed below:  She has Vision problems based on screening of functional status.       Depression Screening (PHQ):  PHQ-2 SCORE: 1  , done 2025   Feeling down, depressed, or hopeless: 1    Trouble falling or staying asleep, or sleeping too much: 3 (long term.  Could  be related to diabetes)     Feeling tired or having little energy: 1    Poor appetite or overeatin    Trouble concentrating on things, such as reading the newspaper or watching television: 3    Moving or speaking so slowly that other people could have noticed. Or the opposite - being so fidgety or restless that you have been moving around a lot more than usual: 1    If you checked off any problems, how difficult have these problems made it for you to do your work, take care of things at home, or get along with other people?: Very difficult    [facility] Suicide Screening on 2025 was No Risk.      Advanced Directives:   She does have a Living Will but we do NOT have it on file in Epic.    She does have a POA but we do NOT have it on file in [facility].    Patient has Advance Care Planning documents but we do not have a copy in EMR. Discussed Advanced  Care Planning with patient and instructed patient to get our office a copy to be scanned into EMR.      Problem List[1]  Allergies:  She is allergic to ciprofloxacin.    Current Medications:  Active Meds, Sig Only[2]    Medical History:  She  has a past medical history of Abnormal mammogram (8/5/2022), Bronchospasm (12/16/2017), Community acquired pneumonia (12/16/2017), Dry skin (3/7/2019), Essential hypertension, Hypothyroidism, and Toe problem (3/7/2019).  Surgical History:  She  has a past surgical history that includes hysterectomy; removal gallbladder; other surgical history (09/09/2020); and needle biopsy left.   Family History:  Her family history includes Breast Cancer (age of onset: 58) in her daughter; Hypertension in her mother.  Social History:  She  reports that she has never smoked. She has never used smokeless tobacco. She reports that she does not drink alcohol and does not use drugs.    Tobacco:  She has never smoked tobacco.    CAGE Alcohol Screen:   CAGE screening score of 0 on 5/23/2025, showing low risk of alcohol abuse.      Patient Care Team:  Esme Key MD as PCP - General (Internal Medicine)    Review of Systems   Constitutional:  Negative for fever.   HENT:  Negative for congestion.    Eyes:  Negative for visual disturbance.   Respiratory:  Negative for shortness of breath.    Cardiovascular:  Negative for chest pain.   Gastrointestinal:  Negative for constipation.   Genitourinary:  Negative for dysuria.   Neurological: Negative.    Hematological: Negative.    Psychiatric/Behavioral: Negative.           Objective:   Physical Exam  Vitals reviewed.   Constitutional:       General: She is not in acute distress.     Appearance: She is well-developed.   HENT:      Head: Normocephalic and atraumatic.      Right Ear: Tympanic membrane normal.      Left Ear: Tympanic membrane normal.   Eyes:      Conjunctiva/sclera: Conjunctivae normal.   Cardiovascular:      Rate and Rhythm: Normal rate and  regular rhythm.      Heart sounds: Normal heart sounds.   Pulmonary:      Effort: Pulmonary effort is normal.      Breath sounds: Normal breath sounds.   Musculoskeletal:      Cervical back: Neck supple.      Right lower leg: No edema.      Left lower leg: No edema.   Lymphadenopathy:      Cervical: No cervical adenopathy.   Skin:     General: Skin is warm and dry.   Neurological:      General: No focal deficit present.      Mental Status: She is alert.   Psychiatric:         Mood and Affect: Mood normal.           /70   Pulse 70   Temp 97.4 °F (36.3 °C) (Temporal)   Resp 16   Ht 5' 5\" (1.651 m)   Wt 132 lb 12.8 oz (60.2 kg)   SpO2 96%   BMI 22.10 kg/m²  Estimated body mass index is 22.1 kg/m² as calculated from the following:    Height as of this encounter: 5' 5\" (1.651 m).    Weight as of this encounter: 132 lb 12.8 oz (60.2 kg).    Medicare Hearing Assessment:   Hearing Screening    Time taken: 5/23/2025  1:37 PM  Entry User: Lavern Uriostegui  Screening Method: Finger Rub  Finger Rub Result: Pass               Assessment & Plan:   Melina Upton is a 80 year old female who presents for a Medicare Assessment.     1. Wellness examination (Primary)  -due for shingrix  -flu shot this fall   2. Age-related cataract of both eyes, unspecified age-related cataract type -new worsened problem. See ophtho  -     Ophthalmology Referral - External  3. Screening mammogram for breast cancer  -     [facility] VESTA 2D+3D SCREENING BILAT (CPT=77067/64990); Future; Expected date: 05/23/2025  4. Type 2 diabetes mellitus without complication, without long-term current use of insulin (HCC) - Currently prescribed by endo: glimepiride 4mg BID, metformin 1000mg BID, ozempic 0.5mg weekly   -eye exam due in August  -last A1c was in 7/2025 at 7.5  -     Endocrine Referral - In Network  -     Lipid Panel; Future; Expected date: 05/23/2025  -     Hemoglobin A1C; Future; Expected date: 05/23/2025  -     Microalb/Creat Ratio, Random  Urine; Future; Expected date: 05/23/2025  -     Comp Metabolic Panel (14); Future; Expected date: 05/23/2025  5. Hypothyroidism, unspecified type - chronic, stable. Cont levothyroxine. Check TSH  -     TSH W Reflex To Free T4; Future; Expected date: 05/23/2025  6. Abnormal RBC indices - not active problem  7. Recurrent UTI -stable  8. Caregiver stress -stable  9. Essential hypertension -chronic, stable. BP at goal              The patient indicates understanding of these issues and agrees to the plan.  Reinforced healthy diet, lifestyle, and exercise.      Return in about 6 months (around 11/23/2025).     Esme Key MD, 5/23/2025     Supplementary Documentation:   General Health:  In the past six months, have you lost more than 10 pounds without trying?: 2 - No  Has your appetite been poor?: No  Type of Diet: Diabetic  How does the patient maintain a good energy level?: Daily Walks  How would you describe your daily physical activity?: Moderate  How would you describe your current health state?: Good  How do you maintain positive mental well-being?: Visiting Family, Visiting Friends  On a scale of 0 to 10, with 0 being no pain and 10 being severe pain, what is your pain level?: 0 - (None)  In the past six months, have you experienced urine leakage?: 1-Yes  At any time do you feel concerned for the safety/well-being of yourself and/or your children, in your home or elsewhere?: No  Have you had any immunizations at another office such as Influenza, Hepatitis B, Tetanus, or Pneumococcal?: Yes    Health Maintenance   Topic Date Due    Zoster Vaccines (1 of 2) Never done    Diabetes Care Dilated Eye Exam  06/13/2024    COVID-19 Vaccine (3 - 2024-25 season) 09/01/2024    Diabetes Care A1C  09/11/2024    Annual Well Visit  01/01/2025    Diabetes Care: Microalb/Creat Ratio (Annual)  01/01/2025    Diabetes Care: GFR  03/11/2025    Diabetes Care Foot Exam  07/10/2025    Influenza Vaccine (Season Ended) 10/01/2025    DEXA  Scan  Completed    Annual Depression Screening  Completed    Fall Risk Screening (Annual)  Completed    Meningococcal B Vaccine  Aged Out    Mammogram  Discontinued            [1]   Patient Active Problem List  Diagnosis    Hypothyroidism    Essential hypertension    Type 2 diabetes mellitus without complication, without long-term current use of insulin (HCC)    Mixed hyperlipidemia due to type 2 diabetes mellitus (HCC)    Recurrent UTI    Abnormal RBC indices    Caregiver stress    Age-related cataract of both eyes   [2]   Outpatient Medications Marked as Taking for the 5/23/25 encounter (Office Visit) with Esme Key MD   Medication Sig    OZEMPIC, 0.25 OR 0.5 MG/DOSE, 2 MG/3ML Subcutaneous Solution Pen-injector Inject 0.5 mg into the skin once a week.    METFORMIN HCL 1000 MG Oral Tab TAKE 1 TABLET TWICE DAILY WITH MEALS    LISINOPRIL 40 MG Oral Tab TAKE 1 TABLET EVERY DAY    AMLODIPINE 5 MG Oral Tab TAKE 1 TABLET EVERY DAY    metoprolol tartrate 50 MG Oral Tab Take 1 tablet (50 mg total) by mouth 2 (two) times daily.    levothyroxine 50 MCG Oral Tab Take 1 tablet (50 mcg total) by mouth daily.    aspirin 81 MG Oral Tab EC Take 1 tablet (81 mg total) by mouth daily.    DROPLET PEN NEEDLES 32G X 4 MM Does not apply Misc USE DAILY    glimepiride 4 MG Oral Tab Take 1 tablet (4 mg total) by mouth 2 (two) times daily.    Glucose Blood (TRUE METRIX BLOOD GLUCOSE TEST) In Vitro Strip Take 1 Bottle by mouth As Directed.    Blood Glucose Calibration (TRUE METRIX LEVEL 3) High In Vitro Solution 1 Bottle by In Vitro route daily.    Alcohol Swabs (BD SWAB SINGLE USE REGULAR) Does not apply Pads USE AS DIRECTED    Insulin Pen Needle (BD PEN NEEDLE YON U/F) 32G X 4 MM Does not apply Misc Use 6x daily    Blood Glucose Calibration (TRUE METRIX LEVEL 1) Low In Vitro Solution 1 Bottle by In Vitro route daily.

## 2025-05-28 ENCOUNTER — HOSPITAL ENCOUNTER (OUTPATIENT)
Dept: MAMMOGRAPHY | Age: 80
Discharge: HOME | End: 2025-05-28
Attending: INTERNAL MEDICINE
Payer: MEDICARE

## 2025-05-28 DIAGNOSIS — Z12.31 SCREENING MAMMOGRAM FOR BREAST CANCER: ICD-10-CM

## 2025-05-28 PROCEDURE — 77063 BREAST TOMOSYNTHESIS BI: CPT | Performed by: INTERNAL MEDICINE

## 2025-05-28 PROCEDURE — 77067 SCR MAMMO BI INCL CAD: CPT | Performed by: INTERNAL MEDICINE

## 2025-05-29 PROBLEM — H25.9 AGE-RELATED CATARACT OF BOTH EYES: Status: ACTIVE | Noted: 2025-05-29

## 2025-07-11 DIAGNOSIS — E11.9 TYPE 2 DIABETES MELLITUS WITHOUT COMPLICATION, WITHOUT LONG-TERM CURRENT USE OF INSULIN (HCC): ICD-10-CM

## 2025-07-14 RX ORDER — GLIMEPIRIDE 4 MG/1
4 TABLET ORAL
Qty: 90 TABLET | Refills: 2 | Status: SHIPPED | OUTPATIENT
Start: 2025-07-14

## 2025-08-26 ENCOUNTER — HOSPITAL ENCOUNTER (OUTPATIENT)
Dept: ULTRASOUND IMAGING | Age: 80
Discharge: HOME | End: 2025-08-26
Attending: STUDENT IN AN ORGANIZED HEALTH CARE EDUCATION/TRAINING PROGRAM

## 2025-08-26 ENCOUNTER — LAB ENCOUNTER (OUTPATIENT)
Dept: LAB | Age: 80
End: 2025-08-26
Attending: STUDENT IN AN ORGANIZED HEALTH CARE EDUCATION/TRAINING PROGRAM

## 2025-08-26 DIAGNOSIS — E11.9 TYPE 2 DIABETES MELLITUS WITHOUT COMPLICATION, WITHOUT LONG-TERM CURRENT USE OF INSULIN (HCC): ICD-10-CM

## 2025-08-26 DIAGNOSIS — E03.9 HYPOTHYROIDISM, UNSPECIFIED TYPE: ICD-10-CM

## 2025-08-26 DIAGNOSIS — E11.65 TYPE 2 DIABETES MELLITUS WITH HYPERGLYCEMIA, WITHOUT LONG-TERM CURRENT USE OF INSULIN (HCC): ICD-10-CM

## 2025-08-26 DIAGNOSIS — E83.50 UNSPECIFIED DISORDER OF CALCIUM METABOLISM: ICD-10-CM

## 2025-08-26 LAB
ALBUMIN SERPL-MCNC: 4.3 G/DL (ref 3.2–4.8)
ALBUMIN/GLOB SERPL: 2 (ref 1–2)
ALP LIVER SERPL-CCNC: 45 U/L (ref 55–142)
ALT SERPL-CCNC: 29 U/L (ref 10–49)
ANION GAP SERPL CALC-SCNC: 13 MMOL/L (ref 0–18)
AST SERPL-CCNC: 27 U/L (ref ?–34)
BILIRUB SERPL-MCNC: 0.4 MG/DL (ref 0.2–1.1)
BUN BLD-MCNC: 16 MG/DL (ref 9–23)
CALCIUM BLD-MCNC: 9.9 MG/DL (ref 8.7–10.6)
CHLORIDE SERPL-SCNC: 103 MMOL/L (ref 98–112)
CHOLEST SERPL-MCNC: 123 MG/DL (ref ?–200)
CO2 SERPL-SCNC: 24 MMOL/L (ref 21–32)
CREAT BLD-MCNC: 0.99 MG/DL (ref 0.55–1.02)
CREAT UR-SCNC: 144.7 MG/DL
EGFRCR SERPLBLD CKD-EPI 2021: 58 ML/MIN/1.73M2 (ref 60–?)
EST. AVERAGE GLUCOSE BLD GHB EST-MCNC: 177 MG/DL (ref 68–126)
FASTING PATIENT LIPID ANSWER: YES
FASTING STATUS PATIENT QL REPORTED: YES
GLOBULIN PLAS-MCNC: 2.2 G/DL (ref 2–3.5)
GLUCOSE BLD-MCNC: 140 MG/DL (ref 70–99)
HBA1C MFR BLD: 7.8 % (ref ?–5.7)
HDLC SERPL-MCNC: 63 MG/DL (ref 40–59)
LDLC SERPL CALC-MCNC: 45 MG/DL (ref ?–100)
MICROALBUMIN UR-MCNC: <0.3 MG/DL
NONHDLC SERPL-MCNC: 60 MG/DL (ref ?–130)
OSMOLALITY SERPL CALC.SUM OF ELEC: 293 MOSM/KG (ref 275–295)
POTASSIUM SERPL-SCNC: 5.1 MMOL/L (ref 3.5–5.1)
PROT SERPL-MCNC: 6.5 G/DL (ref 5.7–8.2)
SODIUM SERPL-SCNC: 140 MMOL/L (ref 136–145)
T4 FREE SERPL-MCNC: 1.4 NG/DL (ref 0.8–1.7)
THYROGLOB SERPL-MCNC: 38 U/ML (ref ?–60)
THYROPEROXIDASE AB SERPL-ACNC: <28 U/ML (ref ?–60)
TRIGL SERPL-MCNC: 78 MG/DL (ref 30–149)
TSI SER-ACNC: 2.75 UIU/ML (ref 0.55–4.78)
VIT D+METAB SERPL-MCNC: 23.4 NG/ML (ref 30–100)
VLDLC SERPL CALC-MCNC: 11 MG/DL (ref 0–30)

## 2025-08-26 PROCEDURE — 82043 UR ALBUMIN QUANTITATIVE: CPT

## 2025-08-26 PROCEDURE — 84439 ASSAY OF FREE THYROXINE: CPT

## 2025-08-26 PROCEDURE — 84443 ASSAY THYROID STIM HORMONE: CPT

## 2025-08-26 PROCEDURE — 76536 US EXAM OF HEAD AND NECK: CPT | Performed by: STUDENT IN AN ORGANIZED HEALTH CARE EDUCATION/TRAINING PROGRAM

## 2025-08-26 PROCEDURE — 82306 VITAMIN D 25 HYDROXY: CPT

## 2025-08-26 PROCEDURE — 36415 COLL VENOUS BLD VENIPUNCTURE: CPT

## 2025-08-26 PROCEDURE — 80053 COMPREHEN METABOLIC PANEL: CPT

## 2025-08-26 PROCEDURE — 83036 HEMOGLOBIN GLYCOSYLATED A1C: CPT

## 2025-08-26 PROCEDURE — 80061 LIPID PANEL: CPT

## 2025-08-26 PROCEDURE — 86376 MICROSOMAL ANTIBODY EACH: CPT

## 2025-08-26 PROCEDURE — 86800 THYROGLOBULIN ANTIBODY: CPT

## 2025-08-26 PROCEDURE — 82570 ASSAY OF URINE CREATININE: CPT

## (undated) NOTE — LETTER
07/14/21        Melina Upton  [address] [place] [address]      Dear Bartolome Dietz records indicate that you have outstanding lab work and or testing that was ordered for you and has not yet been completed:  Orders Placed This [place]

## (undated) NOTE — MR AVS SNAPSHOT
[facility]  [id number] [phone]               Thank you for choosing us for your health care visit with ADELAIDE Dong. We are glad to serve you and happy to provide you with this summary of your visit.   Dinorah infection. Symptoms  The infection causes inflammation in the urethra and bladder. This causes many of the symptoms.  The most common symptoms of a bladder infection are:  · Pain or burning when urinating  · Having to urinate more often than usual  · Rbadford Leydi kidney disease, talk with your healthcare provider before using these medicines. Also talk with your provider if you've ever had a stomach ulcer or gastrointestinal bleeding, or are taking blood-thinner medicines.   · If you are given phenazopydridine to re Call your healthcare provider right away if any of these occur:  · Fever of 100.4ºF (38. 0ºC) or higher, or as directed by your healthcare provider  · Symptoms are not better by the third day of treatment  · Back or belly (abdominal) pain that gets worse  · Results of Recent Testing     URINALYSIS, AUTO, W/O SCOPE      Component Value Standard Range & Units    GLUCOSE (URINE DIPSTICK) 500 Negative mg/dL    BILIRUBIN Negative Negative    KETONES (URINE DIPSTICK) Negative Negative mg/dL    SPECIFIC GRAVITY 1.01 dairy products with reduced content of saturated and total fat.    Dietary sodium reduction Reduce dietary sodium intake to <= 100 mmol per day (2.4 g sodium or 6 g sodium chloride)   Aerobic physical activity Regular aerobic physical activity (e.g., brisk

## (undated) NOTE — LETTER
05/20/21        Melina Upton   [address] [id number]      Dear Gaston Meyers records indicate that you have outstanding lab work and or testing that was ordered for you and has not yet been completed:  Orders Placed This [place]

## (undated) NOTE — LETTER
Patient Name: Jaren Munoz  : [birth year]  MRN: [id number]  Patient Address: [facility] [address] [id number]      Coronavirus Disease 2019 (COVID-19)     [facility] is committed to the safety and well-being of our patients, me carefully. If your symptoms get worse, call your healthcare provider immediately. 3. Get rest and stay hydrated.    4. If you have a medical appointment, call the healthcare provider ahead of time and tell them that you have or may have COVID-19.  5. For m of fever-reducing medications; and  · Improvement in respiratory symptoms (e.g., cough, shortness of breath); and  · At least 10 days have passed since symptoms first appeared OR if asymptomatic patient or date of symptom onset is unclear then use 10 days donors must:    · Have had a confirmed diagnosis of COVID-19  · Be symptom-free for at least 14 days*    *Some people will be required to have a repeat COVID-19 test in order to be eligible to donate.  If you’re instructed by [facility] that a repeat test is r random. Researchers are trying to identify similarities between people with a Post-COVID condition to better understand if there are risk factors. How do I prevent a Post-COVID condition?   The best way to prevent the long-term symptoms of COVID-19 is

## (undated) NOTE — LETTER
03/27/19        Melina Upton   [address] [id number]      Dear Warren Vick records indicate that you have outstanding lab work and or testing that was ordered for you and has not yet been completed: Mammogram and Bone Densi